# Patient Record
Sex: FEMALE | Race: WHITE | HISPANIC OR LATINO | Employment: FULL TIME | URBAN - METROPOLITAN AREA
[De-identification: names, ages, dates, MRNs, and addresses within clinical notes are randomized per-mention and may not be internally consistent; named-entity substitution may affect disease eponyms.]

---

## 2017-02-27 ENCOUNTER — LAB REQUISITION (OUTPATIENT)
Dept: LAB | Facility: HOSPITAL | Age: 27
End: 2017-02-27
Payer: COMMERCIAL

## 2017-02-27 ENCOUNTER — ALLSCRIPTS OFFICE VISIT (OUTPATIENT)
Dept: OTHER | Facility: OTHER | Age: 27
End: 2017-02-27

## 2017-02-27 ENCOUNTER — TRANSCRIBE ORDERS (OUTPATIENT)
Dept: LAB | Facility: HOSPITAL | Age: 27
End: 2017-02-27

## 2017-02-27 ENCOUNTER — APPOINTMENT (OUTPATIENT)
Dept: LAB | Facility: HOSPITAL | Age: 27
End: 2017-02-27
Payer: COMMERCIAL

## 2017-02-27 DIAGNOSIS — J34.89 OTHER SPECIFIED DISORDERS OF NOSE AND NASAL SINUSES: ICD-10-CM

## 2017-02-27 DIAGNOSIS — Z34.91 ENCOUNTER FOR SUPERVISION OF NORMAL PREGNANCY IN FIRST TRIMESTER: ICD-10-CM

## 2017-02-27 DIAGNOSIS — R10.2 PELVIC AND PERINEAL PAIN: ICD-10-CM

## 2017-02-27 DIAGNOSIS — Z33.1 PREGNANT STATE, INCIDENTAL: ICD-10-CM

## 2017-02-27 LAB
ANION GAP SERPL CALCULATED.3IONS-SCNC: 8 MMOL/L (ref 4–13)
BACTERIA UR QL AUTO: ABNORMAL /HPF
BASOPHILS # BLD AUTO: 0.01 THOUSANDS/ΜL (ref 0–0.1)
BASOPHILS NFR BLD AUTO: 0 % (ref 0–1)
BILIRUB UR QL STRIP: NEGATIVE
BUN SERPL-MCNC: 4 MG/DL (ref 5–25)
CALCIUM SERPL-MCNC: 8.8 MG/DL (ref 8.3–10.1)
CHLORIDE SERPL-SCNC: 102 MMOL/L (ref 100–108)
CLARITY UR: ABNORMAL
CO2 SERPL-SCNC: 25 MMOL/L (ref 21–32)
COLOR UR: YELLOW
CREAT SERPL-MCNC: 0.52 MG/DL (ref 0.6–1.3)
EOSINOPHIL # BLD AUTO: 0.01 THOUSAND/ΜL (ref 0–0.61)
EOSINOPHIL NFR BLD AUTO: 0 % (ref 0–6)
ERYTHROCYTE [DISTWIDTH] IN BLOOD BY AUTOMATED COUNT: 13.2 % (ref 11.6–15.1)
FLUAV AG SPEC QL IA: POSITIVE
FLUBV AG SPEC QL IA: NEGATIVE
GFR SERPL CREATININE-BSD FRML MDRD: >60 ML/MIN/1.73SQ M
GLUCOSE SERPL-MCNC: 92 MG/DL (ref 65–140)
GLUCOSE UR STRIP-MCNC: NEGATIVE MG/DL
HCT VFR BLD AUTO: 38.2 % (ref 34.8–46.1)
HGB BLD-MCNC: 12.7 G/DL (ref 11.5–15.4)
HGB UR QL STRIP.AUTO: ABNORMAL
KETONES UR STRIP-MCNC: NEGATIVE MG/DL
LEUKOCYTE ESTERASE UR QL STRIP: ABNORMAL
LYMPHOCYTES # BLD AUTO: 0.8 THOUSANDS/ΜL (ref 0.6–4.47)
LYMPHOCYTES NFR BLD AUTO: 9 % (ref 14–44)
MCH RBC QN AUTO: 29.8 PG (ref 26.8–34.3)
MCHC RBC AUTO-ENTMCNC: 33.2 G/DL (ref 31.4–37.4)
MCV RBC AUTO: 90 FL (ref 82–98)
MONOCYTES # BLD AUTO: 1 THOUSAND/ΜL (ref 0.17–1.22)
MONOCYTES NFR BLD AUTO: 11 % (ref 4–12)
NEUTROPHILS # BLD AUTO: 7.57 THOUSANDS/ΜL (ref 1.85–7.62)
NEUTS SEG NFR BLD AUTO: 80 % (ref 43–75)
NITRITE UR QL STRIP: NEGATIVE
NON-SQ EPI CELLS URNS QL MICRO: ABNORMAL /HPF
NRBC BLD AUTO-RTO: 0 /100 WBCS
OTHER STN SPEC: ABNORMAL
PH UR STRIP.AUTO: 6.5 [PH] (ref 4.5–8)
PLATELET # BLD AUTO: 195 THOUSANDS/UL (ref 149–390)
PMV BLD AUTO: 12.2 FL (ref 8.9–12.7)
POTASSIUM SERPL-SCNC: 3.4 MMOL/L (ref 3.5–5.3)
PROT UR STRIP-MCNC: NEGATIVE MG/DL
RBC # BLD AUTO: 4.26 MILLION/UL (ref 3.81–5.12)
RBC #/AREA URNS AUTO: ABNORMAL /HPF
SODIUM SERPL-SCNC: 135 MMOL/L (ref 136–145)
SP GR UR STRIP.AUTO: 1.01 (ref 1–1.03)
UROBILINOGEN UR QL STRIP.AUTO: 0.2 E.U./DL
WBC # BLD AUTO: 9.41 THOUSAND/UL (ref 4.31–10.16)
WBC #/AREA URNS AUTO: ABNORMAL /HPF

## 2017-02-27 PROCEDURE — 87400 INFLUENZA A/B EACH AG IA: CPT | Performed by: OBSTETRICS & GYNECOLOGY

## 2017-02-27 PROCEDURE — 81001 URINALYSIS AUTO W/SCOPE: CPT | Performed by: OBSTETRICS & GYNECOLOGY

## 2017-02-27 PROCEDURE — 85025 COMPLETE CBC W/AUTO DIFF WBC: CPT

## 2017-02-27 PROCEDURE — 36415 COLL VENOUS BLD VENIPUNCTURE: CPT

## 2017-02-27 PROCEDURE — 87086 URINE CULTURE/COLONY COUNT: CPT | Performed by: OBSTETRICS & GYNECOLOGY

## 2017-02-27 PROCEDURE — 80048 BASIC METABOLIC PNL TOTAL CA: CPT

## 2017-03-01 LAB
BACTERIA UR CULT: NORMAL
BACTERIA UR CULT: NORMAL

## 2017-03-02 ENCOUNTER — ALLSCRIPTS OFFICE VISIT (OUTPATIENT)
Dept: OTHER | Facility: OTHER | Age: 27
End: 2017-03-02

## 2017-03-05 ENCOUNTER — HOSPITAL ENCOUNTER (EMERGENCY)
Facility: HOSPITAL | Age: 27
Discharge: HOME | End: 2017-03-05
Attending: EMERGENCY MEDICINE | Admitting: EMERGENCY MEDICINE
Payer: COMMERCIAL

## 2017-03-05 VITALS
HEART RATE: 60 BPM | RESPIRATION RATE: 16 BRPM | SYSTOLIC BLOOD PRESSURE: 94 MMHG | BODY MASS INDEX: 21.97 KG/M2 | TEMPERATURE: 98.6 F | OXYGEN SATURATION: 100 % | DIASTOLIC BLOOD PRESSURE: 53 MMHG | WEIGHT: 124 LBS

## 2017-03-05 DIAGNOSIS — O21.9 NAUSEA/VOMITING IN PREGNANCY: Primary | ICD-10-CM

## 2017-03-05 LAB
ANION GAP BLD CALC-SCNC: 20 MMOL/L (ref 4–13)
BACTERIA UR QL AUTO: ABNORMAL /HPF
BILIRUB UR QL STRIP: NEGATIVE
BUN BLD-MCNC: 4 MG/DL (ref 5–25)
CA-I BLD-SCNC: 1.16 MMOL/L (ref 1.12–1.32)
CHLORIDE BLD-SCNC: 101 MMOL/L (ref 100–108)
CLARITY UR: CLEAR
COLOR UR: YELLOW
COLOR, POC: YELLOW
CREAT BLD-MCNC: 0.4 MG/DL (ref 0.6–1.3)
GFR SERPL CREATININE-BSD FRML MDRD: >60 ML/MIN/1.73SQ M
GLUCOSE SERPL-MCNC: 105 MG/DL (ref 65–140)
GLUCOSE UR STRIP-MCNC: NEGATIVE MG/DL
HCG UR QL: POSITIVE
HCT VFR BLD CALC: 38 % (ref 34.8–46.1)
HGB BLDA-MCNC: 12.9 G/DL (ref 11.5–15.4)
HGB UR QL STRIP.AUTO: NEGATIVE
HYALINE CASTS #/AREA URNS LPF: ABNORMAL /LPF
KETONES UR STRIP-MCNC: ABNORMAL MG/DL
LEUKOCYTE ESTERASE UR QL STRIP: ABNORMAL
NITRITE UR QL STRIP: NEGATIVE
NON-SQ EPI CELLS URNS QL MICRO: ABNORMAL /HPF
PCO2 BLD: 22 MMOL/L (ref 21–32)
PH UR STRIP.AUTO: 7 [PH] (ref 4.5–8)
POTASSIUM BLD-SCNC: 3.7 MMOL/L (ref 3.5–5.3)
PROT UR STRIP-MCNC: ABNORMAL MG/DL
RBC #/AREA URNS AUTO: ABNORMAL /HPF
SODIUM BLD-SCNC: 137 MMOL/L (ref 136–145)
SP GR UR STRIP.AUTO: 1.02 (ref 1–1.03)
SPECIMEN SOURCE: ABNORMAL
UROBILINOGEN UR QL STRIP.AUTO: 2 E.U./DL
WBC #/AREA URNS AUTO: ABNORMAL /HPF

## 2017-03-05 PROCEDURE — 87086 URINE CULTURE/COLONY COUNT: CPT

## 2017-03-05 PROCEDURE — 81002 URINALYSIS NONAUTO W/O SCOPE: CPT | Performed by: EMERGENCY MEDICINE

## 2017-03-05 PROCEDURE — 96361 HYDRATE IV INFUSION ADD-ON: CPT

## 2017-03-05 PROCEDURE — 81001 URINALYSIS AUTO W/SCOPE: CPT

## 2017-03-05 PROCEDURE — 80047 BASIC METABLC PNL IONIZED CA: CPT

## 2017-03-05 PROCEDURE — 81025 URINE PREGNANCY TEST: CPT | Performed by: EMERGENCY MEDICINE

## 2017-03-05 PROCEDURE — 85014 HEMATOCRIT: CPT

## 2017-03-05 PROCEDURE — 99284 EMERGENCY DEPT VISIT MOD MDM: CPT

## 2017-03-05 PROCEDURE — 96374 THER/PROPH/DIAG INJ IV PUSH: CPT

## 2017-03-05 RX ORDER — ONDANSETRON 2 MG/ML
4 INJECTION INTRAMUSCULAR; INTRAVENOUS ONCE
Status: COMPLETED | OUTPATIENT
Start: 2017-03-05 | End: 2017-03-05

## 2017-03-05 RX ORDER — NITROFURANTOIN 25; 75 MG/1; MG/1
100 CAPSULE ORAL 2 TIMES DAILY
Qty: 14 CAPSULE | Refills: 0 | Status: SHIPPED | OUTPATIENT
Start: 2017-03-05 | End: 2017-03-12

## 2017-03-05 RX ORDER — ONDANSETRON 4 MG/1
4 TABLET, FILM COATED ORAL EVERY 6 HOURS
Qty: 30 TABLET | Refills: 0 | Status: SHIPPED | OUTPATIENT
Start: 2017-03-05 | End: 2018-03-30 | Stop reason: ALTCHOICE

## 2017-03-05 RX ADMIN — SODIUM CHLORIDE 1000 ML: 0.9 INJECTION, SOLUTION INTRAVENOUS at 17:49

## 2017-03-05 RX ADMIN — ONDANSETRON 4 MG: 2 INJECTION INTRAMUSCULAR; INTRAVENOUS at 17:58

## 2017-03-07 LAB — BACTERIA UR CULT: NORMAL

## 2017-03-13 ENCOUNTER — TRANSCRIBE ORDERS (OUTPATIENT)
Dept: LAB | Facility: HOSPITAL | Age: 27
End: 2017-03-13

## 2017-03-13 ENCOUNTER — GENERIC CONVERSION - ENCOUNTER (OUTPATIENT)
Dept: OTHER | Facility: OTHER | Age: 27
End: 2017-03-13

## 2017-03-13 ENCOUNTER — ALLSCRIPTS OFFICE VISIT (OUTPATIENT)
Dept: OTHER | Facility: OTHER | Age: 27
End: 2017-03-13

## 2017-03-13 ENCOUNTER — APPOINTMENT (OUTPATIENT)
Dept: LAB | Facility: HOSPITAL | Age: 27
End: 2017-03-13
Attending: OBSTETRICS & GYNECOLOGY
Payer: COMMERCIAL

## 2017-03-13 DIAGNOSIS — Z34.91 ENCOUNTER FOR SUPERVISION OF NORMAL PREGNANCY IN FIRST TRIMESTER: ICD-10-CM

## 2017-03-13 DIAGNOSIS — Z34.91 ENCOUNTER FOR SUPERVISION OF NORMAL PREGNANCY IN FIRST TRIMESTER, UNSPECIFIED GRAVIDITY: ICD-10-CM

## 2017-03-13 DIAGNOSIS — Z34.91 ENCOUNTER FOR SUPERVISION OF NORMAL PREGNANCY IN FIRST TRIMESTER, UNSPECIFIED GRAVIDITY: Primary | ICD-10-CM

## 2017-03-13 LAB
ABO GROUP BLD: NORMAL
BACTERIA UR QL AUTO: ABNORMAL /HPF
BASOPHILS # BLD AUTO: 0.01 THOUSANDS/ΜL (ref 0–0.1)
BASOPHILS NFR BLD AUTO: 0 % (ref 0–1)
BILIRUB UR QL STRIP: NEGATIVE
BLD GP AB SCN SERPL QL: NEGATIVE
CLARITY UR: ABNORMAL
COLOR UR: YELLOW
EOSINOPHIL # BLD AUTO: 0.04 THOUSAND/ΜL (ref 0–0.61)
EOSINOPHIL NFR BLD AUTO: 0 % (ref 0–6)
ERYTHROCYTE [DISTWIDTH] IN BLOOD BY AUTOMATED COUNT: 13.1 % (ref 11.6–15.1)
GLUCOSE 1H P 50 G GLC PO SERPL-MCNC: 97 MG/DL
GLUCOSE UR STRIP-MCNC: NEGATIVE MG/DL
HBV SURFACE AG SER QL: NORMAL
HCT VFR BLD AUTO: 37.7 % (ref 34.8–46.1)
HGB BLD-MCNC: 12.6 G/DL (ref 11.5–15.4)
HGB UR QL STRIP.AUTO: NEGATIVE
HYALINE CASTS #/AREA URNS LPF: ABNORMAL /LPF
KETONES UR STRIP-MCNC: ABNORMAL MG/DL
LEUKOCYTE ESTERASE UR QL STRIP: ABNORMAL
LYMPHOCYTES # BLD AUTO: 2.77 THOUSANDS/ΜL (ref 0.6–4.47)
LYMPHOCYTES NFR BLD AUTO: 24 % (ref 14–44)
MCH RBC QN AUTO: 29.9 PG (ref 26.8–34.3)
MCHC RBC AUTO-ENTMCNC: 33.4 G/DL (ref 31.4–37.4)
MCV RBC AUTO: 90 FL (ref 82–98)
MONOCYTES # BLD AUTO: 0.66 THOUSAND/ΜL (ref 0.17–1.22)
MONOCYTES NFR BLD AUTO: 6 % (ref 4–12)
NEUTROPHILS # BLD AUTO: 7.88 THOUSANDS/ΜL (ref 1.85–7.62)
NEUTS SEG NFR BLD AUTO: 70 % (ref 43–75)
NITRITE UR QL STRIP: NEGATIVE
NON-SQ EPI CELLS URNS QL MICRO: ABNORMAL /HPF
NRBC BLD AUTO-RTO: 0 /100 WBCS
PH UR STRIP.AUTO: 7 [PH] (ref 4.5–8)
PLATELET # BLD AUTO: 211 THOUSANDS/UL (ref 149–390)
PMV BLD AUTO: 11.4 FL (ref 8.9–12.7)
PROT UR STRIP-MCNC: NEGATIVE MG/DL
RBC # BLD AUTO: 4.21 MILLION/UL (ref 3.81–5.12)
RBC #/AREA URNS AUTO: ABNORMAL /HPF
RH BLD: POSITIVE
RUBV IGG SERPL IA-ACNC: 130.8 IU/ML
SP GR UR STRIP.AUTO: 1.02 (ref 1–1.03)
UROBILINOGEN UR QL STRIP.AUTO: 1 E.U./DL
WBC # BLD AUTO: 11.39 THOUSAND/UL (ref 4.31–10.16)
WBC #/AREA URNS AUTO: ABNORMAL /HPF

## 2017-03-13 PROCEDURE — 81001 URINALYSIS AUTO W/SCOPE: CPT

## 2017-03-13 PROCEDURE — 82950 GLUCOSE TEST: CPT

## 2017-03-13 PROCEDURE — 36415 COLL VENOUS BLD VENIPUNCTURE: CPT

## 2017-03-13 PROCEDURE — 80081 OBSTETRIC PANEL INC HIV TSTG: CPT

## 2017-03-13 PROCEDURE — 87086 URINE CULTURE/COLONY COUNT: CPT

## 2017-03-14 LAB — BACTERIA UR CULT: NORMAL

## 2017-03-15 LAB
HIV 1+2 AB+HIV1 P24 AG SERPL QL IA: NORMAL
RPR SER QL: NORMAL

## 2017-03-27 ENCOUNTER — LAB REQUISITION (OUTPATIENT)
Dept: LAB | Facility: HOSPITAL | Age: 27
End: 2017-03-27
Payer: COMMERCIAL

## 2017-03-27 ENCOUNTER — GENERIC CONVERSION - ENCOUNTER (OUTPATIENT)
Dept: OTHER | Facility: OTHER | Age: 27
End: 2017-03-27

## 2017-03-27 DIAGNOSIS — Z11.3 ENCOUNTER FOR SCREENING FOR INFECTIONS WITH PREDOMINANTLY SEXUAL MODE OF TRANSMISSION: ICD-10-CM

## 2017-03-27 DIAGNOSIS — Z01.419 ENCOUNTER FOR GYNECOLOGICAL EXAMINATION WITHOUT ABNORMAL FINDING: ICD-10-CM

## 2017-03-27 PROCEDURE — 87491 CHLMYD TRACH DNA AMP PROBE: CPT | Performed by: OBSTETRICS & GYNECOLOGY

## 2017-03-27 PROCEDURE — 87591 N.GONORRHOEAE DNA AMP PROB: CPT | Performed by: OBSTETRICS & GYNECOLOGY

## 2017-03-27 PROCEDURE — G0145 SCR C/V CYTO,THINLAYER,RESCR: HCPCS | Performed by: OBSTETRICS & GYNECOLOGY

## 2017-03-29 LAB
CHLAMYDIA DNA CVX QL NAA+PROBE: NORMAL
N GONORRHOEA DNA GENITAL QL NAA+PROBE: NORMAL

## 2017-04-04 LAB
LAB AP GYN PRIMARY INTERPRETATION: NORMAL
Lab: NORMAL
PATH INTERP SPEC-IMP: NORMAL

## 2017-05-03 ENCOUNTER — GENERIC CONVERSION - ENCOUNTER (OUTPATIENT)
Dept: OTHER | Facility: OTHER | Age: 27
End: 2017-05-03

## 2017-06-01 ENCOUNTER — GENERIC CONVERSION - ENCOUNTER (OUTPATIENT)
Dept: OTHER | Facility: OTHER | Age: 27
End: 2017-06-01

## 2017-06-02 ENCOUNTER — ALLSCRIPTS OFFICE VISIT (OUTPATIENT)
Dept: PERINATAL CARE | Facility: CLINIC | Age: 27
End: 2017-06-02
Payer: COMMERCIAL

## 2017-06-02 PROCEDURE — 76805 OB US >/= 14 WKS SNGL FETUS: CPT | Performed by: OBSTETRICS & GYNECOLOGY

## 2017-06-02 PROCEDURE — 76817 TRANSVAGINAL US OBSTETRIC: CPT | Performed by: OBSTETRICS & GYNECOLOGY

## 2017-06-29 ENCOUNTER — GENERIC CONVERSION - ENCOUNTER (OUTPATIENT)
Dept: OTHER | Facility: OTHER | Age: 27
End: 2017-06-29

## 2017-06-29 DIAGNOSIS — Z34.92 ENCOUNTER FOR SUPERVISION OF NORMAL PREGNANCY IN SECOND TRIMESTER: ICD-10-CM

## 2017-07-21 ENCOUNTER — APPOINTMENT (OUTPATIENT)
Dept: LAB | Facility: HOSPITAL | Age: 27
End: 2017-07-21
Payer: COMMERCIAL

## 2017-07-21 ENCOUNTER — TRANSCRIBE ORDERS (OUTPATIENT)
Dept: LAB | Facility: HOSPITAL | Age: 27
End: 2017-07-21

## 2017-07-21 DIAGNOSIS — Z34.92 ENCOUNTER FOR SUPERVISION OF NORMAL PREGNANCY IN SECOND TRIMESTER: ICD-10-CM

## 2017-07-21 LAB
BASOPHILS # BLD AUTO: 0.01 THOUSANDS/ΜL (ref 0–0.1)
BASOPHILS NFR BLD AUTO: 0 % (ref 0–1)
EOSINOPHIL # BLD AUTO: 0.13 THOUSAND/ΜL (ref 0–0.61)
EOSINOPHIL NFR BLD AUTO: 1 % (ref 0–6)
ERYTHROCYTE [DISTWIDTH] IN BLOOD BY AUTOMATED COUNT: 13.7 % (ref 11.6–15.1)
GLUCOSE 1H P 50 G GLC PO SERPL-MCNC: 73 MG/DL
HCT VFR BLD AUTO: 34.7 % (ref 34.8–46.1)
HGB BLD-MCNC: 11.7 G/DL (ref 11.5–15.4)
LYMPHOCYTES # BLD AUTO: 1.81 THOUSANDS/ΜL (ref 0.6–4.47)
LYMPHOCYTES NFR BLD AUTO: 16 % (ref 14–44)
MCH RBC QN AUTO: 31.2 PG (ref 26.8–34.3)
MCHC RBC AUTO-ENTMCNC: 33.7 G/DL (ref 31.4–37.4)
MCV RBC AUTO: 93 FL (ref 82–98)
MONOCYTES # BLD AUTO: 0.7 THOUSAND/ΜL (ref 0.17–1.22)
MONOCYTES NFR BLD AUTO: 6 % (ref 4–12)
NEUTROPHILS # BLD AUTO: 8.34 THOUSANDS/ΜL (ref 1.85–7.62)
NEUTS SEG NFR BLD AUTO: 77 % (ref 43–75)
NRBC BLD AUTO-RTO: 0 /100 WBCS
PLATELET # BLD AUTO: 150 THOUSANDS/UL (ref 149–390)
PMV BLD AUTO: 10.8 FL (ref 8.9–12.7)
RBC # BLD AUTO: 3.75 MILLION/UL (ref 3.81–5.12)
WBC # BLD AUTO: 11.08 THOUSAND/UL (ref 4.31–10.16)

## 2017-07-21 PROCEDURE — 82950 GLUCOSE TEST: CPT

## 2017-07-21 PROCEDURE — 85025 COMPLETE CBC W/AUTO DIFF WBC: CPT

## 2017-07-21 PROCEDURE — 86592 SYPHILIS TEST NON-TREP QUAL: CPT

## 2017-07-21 PROCEDURE — 36415 COLL VENOUS BLD VENIPUNCTURE: CPT

## 2017-07-24 LAB — RPR SER QL: NORMAL

## 2017-07-27 ENCOUNTER — GENERIC CONVERSION - ENCOUNTER (OUTPATIENT)
Dept: OTHER | Facility: OTHER | Age: 27
End: 2017-07-27

## 2017-07-27 ENCOUNTER — LAB REQUISITION (OUTPATIENT)
Dept: LAB | Facility: HOSPITAL | Age: 27
End: 2017-07-27
Payer: COMMERCIAL

## 2017-07-27 DIAGNOSIS — N89.8 OTHER SPECIFIED NONINFLAMMATORY DISORDER OF VAGINA: ICD-10-CM

## 2017-07-27 PROCEDURE — 87070 CULTURE OTHR SPECIMN AEROBIC: CPT | Performed by: OBSTETRICS & GYNECOLOGY

## 2017-07-28 ENCOUNTER — ALLSCRIPTS OFFICE VISIT (OUTPATIENT)
Dept: PERINATAL CARE | Facility: CLINIC | Age: 27
End: 2017-07-28
Payer: COMMERCIAL

## 2017-07-28 PROCEDURE — 76816 OB US FOLLOW-UP PER FETUS: CPT | Performed by: OBSTETRICS & GYNECOLOGY

## 2017-07-29 LAB
BACTERIA GENITAL AEROBE CULT: NORMAL
BACTERIA GENITAL AEROBE CULT: NORMAL

## 2017-08-10 ENCOUNTER — GENERIC CONVERSION - ENCOUNTER (OUTPATIENT)
Dept: OTHER | Facility: OTHER | Age: 27
End: 2017-08-10

## 2017-08-24 ENCOUNTER — GENERIC CONVERSION - ENCOUNTER (OUTPATIENT)
Dept: OTHER | Facility: OTHER | Age: 27
End: 2017-08-24

## 2017-09-07 ENCOUNTER — GENERIC CONVERSION - ENCOUNTER (OUTPATIENT)
Dept: OTHER | Facility: OTHER | Age: 27
End: 2017-09-07

## 2017-09-21 ENCOUNTER — APPOINTMENT (OUTPATIENT)
Dept: LAB | Facility: HOSPITAL | Age: 27
End: 2017-09-21
Payer: COMMERCIAL

## 2017-09-21 ENCOUNTER — GENERIC CONVERSION - ENCOUNTER (OUTPATIENT)
Dept: OTHER | Facility: OTHER | Age: 27
End: 2017-09-21

## 2017-09-21 DIAGNOSIS — Z34.93 ENCOUNTER FOR SUPERVISION OF NORMAL PREGNANCY IN THIRD TRIMESTER: ICD-10-CM

## 2017-09-21 PROCEDURE — 87653 STREP B DNA AMP PROBE: CPT

## 2017-09-22 ENCOUNTER — ALLSCRIPTS OFFICE VISIT (OUTPATIENT)
Dept: PERINATAL CARE | Facility: CLINIC | Age: 27
End: 2017-09-22
Payer: COMMERCIAL

## 2017-09-22 PROCEDURE — 76816 OB US FOLLOW-UP PER FETUS: CPT | Performed by: OBSTETRICS & GYNECOLOGY

## 2017-09-23 LAB — GP B STREP DNA SPEC QL NAA+PROBE: NORMAL

## 2017-09-28 ENCOUNTER — ALLSCRIPTS OFFICE VISIT (OUTPATIENT)
Dept: OTHER | Facility: OTHER | Age: 27
End: 2017-09-28

## 2017-09-28 ENCOUNTER — GENERIC CONVERSION - ENCOUNTER (OUTPATIENT)
Dept: OTHER | Facility: OTHER | Age: 27
End: 2017-09-28

## 2017-10-05 ENCOUNTER — GENERIC CONVERSION - ENCOUNTER (OUTPATIENT)
Dept: OTHER | Facility: OTHER | Age: 27
End: 2017-10-05

## 2017-10-12 ENCOUNTER — GENERIC CONVERSION - ENCOUNTER (OUTPATIENT)
Dept: OTHER | Facility: OTHER | Age: 27
End: 2017-10-12

## 2017-10-19 ENCOUNTER — GENERIC CONVERSION - ENCOUNTER (OUTPATIENT)
Dept: OTHER | Facility: OTHER | Age: 27
End: 2017-10-19

## 2017-10-20 ENCOUNTER — HOSPITAL ENCOUNTER (OUTPATIENT)
Dept: LABOR AND DELIVERY | Facility: HOSPITAL | Age: 27
Discharge: HOME | End: 2017-10-20
Payer: COMMERCIAL

## 2017-10-20 ENCOUNTER — ANESTHESIA (INPATIENT)
Dept: LABOR AND DELIVERY | Facility: HOSPITAL | Age: 27
End: 2017-10-20
Payer: COMMERCIAL

## 2017-10-20 ENCOUNTER — HOSPITAL ENCOUNTER (INPATIENT)
Facility: HOSPITAL | Age: 27
LOS: 2 days | Discharge: HOME | End: 2017-10-22
Attending: OBSTETRICS & GYNECOLOGY | Admitting: OBSTETRICS & GYNECOLOGY
Payer: COMMERCIAL

## 2017-10-20 ENCOUNTER — ANESTHESIA EVENT (INPATIENT)
Dept: LABOR AND DELIVERY | Facility: HOSPITAL | Age: 27
End: 2017-10-20
Payer: COMMERCIAL

## 2017-10-20 DIAGNOSIS — Z3A.40 40 WEEKS GESTATION OF PREGNANCY: Primary | ICD-10-CM

## 2017-10-20 LAB
ABO GROUP BLD: NORMAL
ALBUMIN SERPL BCP-MCNC: 2.6 G/DL (ref 3.5–5)
ALP SERPL-CCNC: 153 U/L (ref 46–116)
ALT SERPL W P-5'-P-CCNC: 14 U/L (ref 12–78)
ANION GAP SERPL CALCULATED.3IONS-SCNC: 10 MMOL/L (ref 4–13)
AST SERPL W P-5'-P-CCNC: 11 U/L (ref 5–45)
BASE EXCESS BLDCOV CALC-SCNC: -2.6 MMOL/L (ref 1–9)
BASOPHILS # BLD AUTO: 0.02 THOUSANDS/ΜL (ref 0–0.1)
BASOPHILS NFR BLD AUTO: 0 % (ref 0–1)
BILIRUB SERPL-MCNC: 0.5 MG/DL (ref 0.2–1)
BLD GP AB SCN SERPL QL: NEGATIVE
BUN SERPL-MCNC: 7 MG/DL (ref 5–25)
CALCIUM SERPL-MCNC: 8.6 MG/DL (ref 8.3–10.1)
CHLORIDE SERPL-SCNC: 105 MMOL/L (ref 100–108)
CO2 SERPL-SCNC: 23 MMOL/L (ref 21–32)
CREAT SERPL-MCNC: 0.52 MG/DL (ref 0.6–1.3)
EOSINOPHIL # BLD AUTO: 0.13 THOUSAND/ΜL (ref 0–0.61)
EOSINOPHIL NFR BLD AUTO: 1 % (ref 0–6)
ERYTHROCYTE [DISTWIDTH] IN BLOOD BY AUTOMATED COUNT: 13.7 % (ref 11.6–15.1)
GFR SERPL CREATININE-BSD FRML MDRD: 130 ML/MIN/1.73SQ M
GLUCOSE SERPL-MCNC: 79 MG/DL (ref 65–140)
HCO3 BLDCOV-SCNC: 22.3 MMOL/L (ref 12.2–28.6)
HCT VFR BLD AUTO: 38.5 % (ref 34.8–46.1)
HGB BLD-MCNC: 13 G/DL (ref 11.5–15.4)
LYMPHOCYTES # BLD AUTO: 2.27 THOUSANDS/ΜL (ref 0.6–4.47)
LYMPHOCYTES NFR BLD AUTO: 15 % (ref 14–44)
MCH RBC QN AUTO: 30.9 PG (ref 26.8–34.3)
MCHC RBC AUTO-ENTMCNC: 33.8 G/DL (ref 31.4–37.4)
MCV RBC AUTO: 91 FL (ref 82–98)
MONOCYTES # BLD AUTO: 1.03 THOUSAND/ΜL (ref 0.17–1.22)
MONOCYTES NFR BLD AUTO: 7 % (ref 4–12)
NEUTROPHILS # BLD AUTO: 11.71 THOUSANDS/ΜL (ref 1.85–7.62)
NEUTS SEG NFR BLD AUTO: 77 % (ref 43–75)
NRBC BLD AUTO-RTO: 0 /100 WBCS
OXYHGB MFR BLDCOV: 39.8 %
PCO2 BLDCOV: 39.1 MM HG (ref 27–43)
PH BLDCOV: 7.37 [PH] (ref 7.19–7.49)
PLATELET # BLD AUTO: 170 THOUSANDS/UL (ref 149–390)
PMV BLD AUTO: 11.3 FL (ref 8.9–12.7)
PO2 BLDCOV: 19.2 MM HG (ref 15–45)
POTASSIUM SERPL-SCNC: 3.6 MMOL/L (ref 3.5–5.3)
PROT SERPL-MCNC: 6.3 G/DL (ref 6.4–8.2)
RBC # BLD AUTO: 4.21 MILLION/UL (ref 3.81–5.12)
RH BLD: POSITIVE
RPR SER QL: NORMAL
SAO2 % BLDCOV: 7.9 ML/DL
SODIUM SERPL-SCNC: 138 MMOL/L (ref 136–145)
SPECIMEN EXPIRATION DATE: NORMAL
WBC # BLD AUTO: 15.27 THOUSAND/UL (ref 4.31–10.16)

## 2017-10-20 PROCEDURE — 86850 RBC ANTIBODY SCREEN: CPT | Performed by: OBSTETRICS & GYNECOLOGY

## 2017-10-20 PROCEDURE — 10907ZC DRAINAGE OF AMNIOTIC FLUID, THERAPEUTIC FROM PRODUCTS OF CONCEPTION, VIA NATURAL OR ARTIFICIAL OPENING: ICD-10-PCS | Performed by: OBSTETRICS & GYNECOLOGY

## 2017-10-20 PROCEDURE — 80053 COMPREHEN METABOLIC PANEL: CPT | Performed by: OBSTETRICS & GYNECOLOGY

## 2017-10-20 PROCEDURE — 82805 BLOOD GASES W/O2 SATURATION: CPT | Performed by: OBSTETRICS & GYNECOLOGY

## 2017-10-20 PROCEDURE — 0KQM0ZZ REPAIR PERINEUM MUSCLE, OPEN APPROACH: ICD-10-PCS | Performed by: OBSTETRICS & GYNECOLOGY

## 2017-10-20 PROCEDURE — 86592 SYPHILIS TEST NON-TREP QUAL: CPT | Performed by: OBSTETRICS & GYNECOLOGY

## 2017-10-20 PROCEDURE — 3E0P3VZ INTRODUCTION OF HORMONE INTO FEMALE REPRODUCTIVE, PERCUTANEOUS APPROACH: ICD-10-PCS | Performed by: OBSTETRICS & GYNECOLOGY

## 2017-10-20 PROCEDURE — 85025 COMPLETE CBC W/AUTO DIFF WBC: CPT | Performed by: OBSTETRICS & GYNECOLOGY

## 2017-10-20 PROCEDURE — 86900 BLOOD TYPING SEROLOGIC ABO: CPT | Performed by: OBSTETRICS & GYNECOLOGY

## 2017-10-20 PROCEDURE — 86901 BLOOD TYPING SEROLOGIC RH(D): CPT | Performed by: OBSTETRICS & GYNECOLOGY

## 2017-10-20 RX ORDER — DOCUSATE SODIUM 100 MG/1
100 CAPSULE, LIQUID FILLED ORAL 2 TIMES DAILY
Status: DISCONTINUED | OUTPATIENT
Start: 2017-10-20 | End: 2017-10-22 | Stop reason: HOSPADM

## 2017-10-20 RX ORDER — DIAPER,BRIEF,INFANT-TODD,DISP
1 EACH MISCELLANEOUS AS NEEDED
Status: DISCONTINUED | OUTPATIENT
Start: 2017-10-20 | End: 2017-10-22 | Stop reason: HOSPADM

## 2017-10-20 RX ORDER — DIPHENHYDRAMINE HYDROCHLORIDE 50 MG/ML
25 INJECTION INTRAMUSCULAR; INTRAVENOUS EVERY 6 HOURS PRN
Status: DISCONTINUED | OUTPATIENT
Start: 2017-10-20 | End: 2017-10-20

## 2017-10-20 RX ORDER — OXYTOCIN/RINGER'S LACTATE 30/500 ML
250 PLASTIC BAG, INJECTION (ML) INTRAVENOUS CONTINUOUS
Status: DISCONTINUED | OUTPATIENT
Start: 2017-10-20 | End: 2017-10-22 | Stop reason: HOSPADM

## 2017-10-20 RX ORDER — ONDANSETRON 2 MG/ML
4 INJECTION INTRAMUSCULAR; INTRAVENOUS EVERY 8 HOURS PRN
Status: DISCONTINUED | OUTPATIENT
Start: 2017-10-20 | End: 2017-10-22 | Stop reason: HOSPADM

## 2017-10-20 RX ORDER — IBUPROFEN 600 MG/1
600 TABLET ORAL EVERY 6 HOURS PRN
Status: DISCONTINUED | OUTPATIENT
Start: 2017-10-20 | End: 2017-10-22 | Stop reason: HOSPADM

## 2017-10-20 RX ORDER — BUPIVACAINE HYDROCHLORIDE 2.5 MG/ML
INJECTION, SOLUTION INFILTRATION; PERINEURAL AS NEEDED
Status: DISCONTINUED | OUTPATIENT
Start: 2017-10-20 | End: 2017-10-20 | Stop reason: SURG

## 2017-10-20 RX ORDER — OXYCODONE HYDROCHLORIDE AND ACETAMINOPHEN 5; 325 MG/1; MG/1
1 TABLET ORAL EVERY 4 HOURS PRN
Status: DISCONTINUED | OUTPATIENT
Start: 2017-10-20 | End: 2017-10-22 | Stop reason: HOSPADM

## 2017-10-20 RX ORDER — FENTANYL CITRATE 50 UG/ML
INJECTION, SOLUTION INTRAMUSCULAR; INTRAVENOUS AS NEEDED
Status: DISCONTINUED | OUTPATIENT
Start: 2017-10-20 | End: 2017-10-20 | Stop reason: SURG

## 2017-10-20 RX ORDER — CALCIUM CARBONATE 200(500)MG
1000 TABLET,CHEWABLE ORAL DAILY PRN
Status: DISCONTINUED | OUTPATIENT
Start: 2017-10-20 | End: 2017-10-22 | Stop reason: HOSPADM

## 2017-10-20 RX ORDER — DIPHENHYDRAMINE HCL 25 MG
25 TABLET ORAL EVERY 6 HOURS PRN
Status: DISCONTINUED | OUTPATIENT
Start: 2017-10-20 | End: 2017-10-22 | Stop reason: HOSPADM

## 2017-10-20 RX ORDER — ACETAMINOPHEN 325 MG/1
650 TABLET ORAL EVERY 6 HOURS PRN
Status: DISCONTINUED | OUTPATIENT
Start: 2017-10-20 | End: 2017-10-22 | Stop reason: HOSPADM

## 2017-10-20 RX ORDER — OXYTOCIN/RINGER'S LACTATE 30/500 ML
1-30 PLASTIC BAG, INJECTION (ML) INTRAVENOUS
Status: DISCONTINUED | OUTPATIENT
Start: 2017-10-20 | End: 2017-10-20

## 2017-10-20 RX ORDER — LIDOCAINE HYDROCHLORIDE AND EPINEPHRINE 15; 5 MG/ML; UG/ML
INJECTION, SOLUTION EPIDURAL AS NEEDED
Status: DISCONTINUED | OUTPATIENT
Start: 2017-10-20 | End: 2017-10-20 | Stop reason: SURG

## 2017-10-20 RX ORDER — NALBUPHINE HCL 10 MG/ML
5 AMPUL (ML) INJECTION
Status: DISCONTINUED | OUTPATIENT
Start: 2017-10-20 | End: 2017-10-20

## 2017-10-20 RX ORDER — OXYCODONE HYDROCHLORIDE AND ACETAMINOPHEN 5; 325 MG/1; MG/1
2 TABLET ORAL EVERY 4 HOURS PRN
Status: DISCONTINUED | OUTPATIENT
Start: 2017-10-20 | End: 2017-10-22 | Stop reason: HOSPADM

## 2017-10-20 RX ORDER — SODIUM CHLORIDE, SODIUM LACTATE, POTASSIUM CHLORIDE, CALCIUM CHLORIDE 600; 310; 30; 20 MG/100ML; MG/100ML; MG/100ML; MG/100ML
125 INJECTION, SOLUTION INTRAVENOUS CONTINUOUS
Status: DISCONTINUED | OUTPATIENT
Start: 2017-10-20 | End: 2017-10-20

## 2017-10-20 RX ADMIN — SODIUM CHLORIDE, SODIUM LACTATE, POTASSIUM CHLORIDE, AND CALCIUM CHLORIDE 125 ML/HR: .6; .31; .03; .02 INJECTION, SOLUTION INTRAVENOUS at 09:20

## 2017-10-20 RX ADMIN — DOCUSATE SODIUM 100 MG: 100 CAPSULE, LIQUID FILLED ORAL at 21:09

## 2017-10-20 RX ADMIN — LIDOCAINE HYDROCHLORIDE AND EPINEPHRINE 2 ML: 15; 5 INJECTION, SOLUTION EPIDURAL at 12:07

## 2017-10-20 RX ADMIN — Medication 2 MILLI-UNITS/MIN: at 10:05

## 2017-10-20 RX ADMIN — OXYCODONE HYDROCHLORIDE AND ACETAMINOPHEN 1 TABLET: 5; 325 TABLET ORAL at 21:08

## 2017-10-20 RX ADMIN — Medication: at 12:10

## 2017-10-20 RX ADMIN — BENZOCAINE AND MENTHOL: 20; .5 SPRAY TOPICAL at 18:55

## 2017-10-20 RX ADMIN — IBUPROFEN 600 MG: 600 TABLET ORAL at 19:16

## 2017-10-20 RX ADMIN — SODIUM CHLORIDE, SODIUM LACTATE, POTASSIUM CHLORIDE, AND CALCIUM CHLORIDE 125 ML/HR: .6; .31; .03; .02 INJECTION, SOLUTION INTRAVENOUS at 12:00

## 2017-10-20 RX ADMIN — FENTANYL CITRATE 15 MCG: 50 INJECTION, SOLUTION INTRAMUSCULAR; INTRAVENOUS at 11:59

## 2017-10-20 RX ADMIN — WITCH HAZEL 1 PAD: 500 SOLUTION RECTAL; TOPICAL at 18:54

## 2017-10-20 RX ADMIN — SODIUM CHLORIDE, SODIUM LACTATE, POTASSIUM CHLORIDE, AND CALCIUM CHLORIDE 125 ML/HR: .6; .31; .03; .02 INJECTION, SOLUTION INTRAVENOUS at 13:31

## 2017-10-20 RX ADMIN — BUPIVACAINE HYDROCHLORIDE 1 ML: 2.5 INJECTION, SOLUTION INFILTRATION; PERINEURAL at 11:59

## 2017-10-20 RX ADMIN — HYDROCORTISONE 1 APPLICATION: 1 CREAM TOPICAL at 18:55

## 2017-10-20 RX ADMIN — Medication 250 MILLI-UNITS/MIN: at 18:19

## 2017-10-20 NOTE — OB LABOR/OXYTOCIN SAFETY PROGRESS
Oxytocin Safety Progress Check Note - Geovanny Vail 28 y o  female MRN: [id number]    Unit/Bed#: -01 Encounter: [id number]    Obstetric History       T1      L1     SAB0   TAB0   Ectopic0   Multiple0   Live Births1      Gestational Age: 41w4d  Dose (antwon-units/min) Oxytocin: 4 antwon-units/min  Contraction Frequency (minutes): 1 5-4 5  Contraction Quality: Mild  Tachysystole: No   Dilation: 4        Effacement (%): 60  Station: -2  Baseline Rate: 140 bpm  Fetal Heart Rate: 141 BPM  FHR Category: Category I          Notes/comments:      AROM'd for clear, mod  Fluid at 1115  Pt comfortable  May receive Nubain and epidural PRN for pain      D/w Dr Gonzalez Gan DO 10/20/2017 11:20 AM

## 2017-10-20 NOTE — H&P
H&P Exam - Obstetrics   Dorinda Valentino 28 y o  female MRN: [id number]  Unit/Bed#: -01 Encounter: [id number]    Assessment/Plan     Assessment:  28year old  at 40 weeks and 2 days here for elective induction of labor  GBS negative  Plan:  1  Admit for elective induction of labor  -routine labs  -IV access  -clear liquid diet  -Induction with pitocin titration per protocol    History of Present Illness   Chief Complaint: elective induction of labor    HPI:  Dorinda Valentino is a 28 y o   female with an BALDEMAR of 10/18/17 at 40 weeks and 2 days gestation who is being admitted for elective induction of labor  Her current obstetrical history is uncomplicated  Contractions: None  Leakage of fluid: None  Bleeding: None  Fetal movement: present  Pregnancy complications: none  Review of Systems   Constitutional: Negative for chills and fever  Eyes: Negative for visual disturbance  Respiratory: Negative for chest tightness and shortness of breath  Cardiovascular: Negative for chest pain  Gastrointestinal: Negative for abdominal pain  Neurological: Negative for dizziness and headaches  Historical Information   OB History    Para Term  AB Living   2 1 1     1   SAB TAB Ectopic Multiple Live Births           1      # Outcome Date GA Lbr Garrick/2nd Weight Sex Delivery Anes PTL Lv   2 Current            1 Term     F Vag-Spont   RACHELLE        Baby complications/comments: none  No past medical history on file    Past Surgical History:   Procedure Laterality Date    CHOLECYSTECTOMY LAPAROSCOPIC N/A 2016    Procedure: CHOLECYSTECTOMY LAPAROSCOPIC;  Surgeon: Marla Velazquez MD;  Location:  MAIN OR;  Service:      Social History   History   Alcohol Use No     History   Drug Use No     History   Smoking Status    Never Smoker   Smokeless Tobacco    Never Used     Family History: non-contributory    Meds/Allergies   all medications and allergies reviewed  No Known Allergies    Objective   Vitals:   There is no height or weight on file to calculate BMI  Invasive Devices          No matching active lines, drains, or airways          Physical Exam   Constitutional: She is oriented to person, place, and time  She appears well-developed and well-nourished  No distress  HENT:   Head: Normocephalic and atraumatic  Neck: Normal range of motion  Cardiovascular: Normal rate  Pulmonary/Chest: Effort normal    Abdominal: Soft  There is no tenderness  Genitourinary: Vagina normal and uterus normal    Musculoskeletal: Normal range of motion  Neurological: She is alert and oriented to person, place, and time  Skin: Skin is warm and dry  She is not diaphoretic  Psychiatric: She has a normal mood and affect   Her behavior is normal      SVE: 4/60/-1    Prenatal Labs:   Blood Type:   Lab Results   Component Value Date/Time    ABO Grouping O 03/13/2017 11:13 AM    ABO Grouping O 07/17/2015 03:15 AM     , D (Rh type):   Lab Results   Component Value Date/Time    Rh Factor Positive 03/13/2017 11:13 AM    Rh Factor Positive 07/17/2015 03:15 AM     , Antibody Screen:   Lab Results   Component Value Date/Time    Antibody Screen Negative 03/13/2017 11:13 AM    Antibody Screen Negative 07/17/2015 03:15 AM    , HCT/HGB:   Lab Results   Component Value Date/Time    Hematocrit 34 7 (L) 07/21/2017 12:38 PM    Hematocrit 32 9 (L) 07/24/2015 09:02 AM    Hemoglobin 11 7 07/21/2017 12:38 PM    Hemoglobin 10 6 (L) 07/24/2015 09:02 AM      , Platelets:   Lab Results   Component Value Date/Time    Platelets 267 74/07/9738 12:38 PM    Platelets 831 88/18/2747 09:02 AM      , 1 hour Glucola:   Lab Results   Component Value Date/Time    GLUCOSE 1 HR 50 GM GLUC CHALLENGE-PREG  04/17/2015 03:25 PM    Glucose 73 07/21/2017 12:38 PM   , Rubella:   Lab Results   Component Value Date/Time    RUBELLA IGG QUANTITATION >175 0 12/03/2014 12:20 PM    Rubella IgG Quant 130 8 03/13/2017 11:13 AM , VDRL/RPR:   Lab Results   Component Value Date/Time    RPR SCREEN Nonreactive 07/17/2015 03:15 AM    RPR Non-Reactive 07/21/2017 12:38 PM      , Hep B:   Lab Results   Component Value Date/Time    HEPATITIS B SURFACE ANTIGEN Non-Reactive (q 12/03/2014 12:20 PM    Hepatitis B Surface Ag Non-reactive 03/13/2017 11:13 AM     , HIV:   Lab Results   Component Value Date/Time    HIV-1/2 AB-AG Non-Reactive (q 12/03/2014 12:20 PM    HIV-1/HIV-2 Ab Non-Reactive 03/13/2017 11:13 AM     , Chlamydia: negative   , Gonorrhea:   Lab Results   Component Value Date/Time    N GONORRHOEAE, AMPLIFIED DNA Negative 12/17/2014 09:01 AM    N gonorrhoeae, DNA Probe N  gonorrhoeae Amplified DNA Negative 03/27/2017 05:54 PM     , Group B Strep:    Lab Results   Component Value Date/Time    Strep Grp B PCR Negative for Beta Hemolytic Strep Grp B by PCR 09/21/2017 04:11 PM          Imaging, EKG, Pathology, and Other Studies: I have personally reviewed pertinent reports

## 2017-10-20 NOTE — DISCHARGE INSTRUCTIONS
Parto vaginal   LO QUE USTED DEBE SABER:   El parto vaginal se produce cuando el bebé nace a través de la vagina (canal del parto)  DESPUÉS DE SER DADO DE ANNA:   Medicamentos:   · Los antiiflamatorios no esteroideos (AINEs) ayudan a reducir inflamación y dolor o fiebre  Giuliana medicamento esta disponible con o sin nando receta médica  Los AINEs podrían causar sangrado estomacal o problemas en los riñones en [employer]  Si usted esta tomando un anticoágulante, siempre  pregunte a scott proveedor de steven si los AINEs son seguros para usted  Antes de [employer], rohit siempre la etiqueta de información y siga rajni indicaciones  · Gideon rajni medicamentos cristian se le haya indicado  Llame a scott proveedor de steven si usted piensa que scott medicamento no le está ayudando o si tiene efectos secundarios  Infórmele si es alérgico a cualquier medicamento  Mantenga nando lista vigente de los medicamentos, vitaminas, y hierbas que dorothy  [address] cantidades, la frecuencia con que los dorothy y por qué los dorothy  Traiga la lista o los envases de rajni medicamentos a las citas de seguimiento  Mantenga la lista consigo en earl de nando emergencia  Bote las listas [place]  Programe nando jassi con scott médico de cabecera:  Maranda Huber de las mujeres necesitan regresar donde scott médico 6 semanas después de un parto vaginal  Pregunte sobre cómo cuidar de rajni heridas o suturas  Anote rajni preguntas para que recuerde hacerlas mariela rajni citas  Actividad:  Descanse siempre que le sea posible  Trate de minimizar rajni actividades  Usted podría empezar a hacer algo de ejercicio poco tiempo después de tener a scott bebé  Hable con scott médico de cabecera antes de empezar a ejercitarse  Si usted trabaja fuera del hogar, pregunte también cuándo debe regresar a scott trabajo  Ejercicios de Kegel:  Los ejercicios de Kegel podrían ayudar a que rajni músculos vaginales y rectales sanen más rápido   Para hacer éstos ejercicios, flexione y relaje los músculos alrededor de scott vagina  Los ejercicios de Kegel fortalecen los músculos  Cuidado de los senos:  Cuando le baja la [place], rajni senos pueden sentirse llenos y duros  Pídale más información a scott médico acerca de cómo cuidar rajni senos, aunque no esté amamantando a scott bebé  Estreñimiento: No radha esfuerzo para realizar nando evacuación intestinal si esta teniendo dificultad para hacerlo  Alimentos altos en [place], [facility] líquido, y el ejercicio regular puede ayudar a prevenir estreñimiento  Ejemplos de estos alimentos incluyen, frutas y afrecho  El [facility] and [facility] y agua son Alejandro evanss líquidos para art  El ejercicio regular ayuda con la función de scott sistema digestivo  Es posible que usted también tenga que usar fibra que se puede obtener sin [place] y laxantes  [employer] se le haya indicado  Hemorroides:  El embarazo puede causar hemorroides graves  Es posible que usted tenga dolor en el recto a causa de las hemorroides  Pregúntele acerca del cuidado de las hemorroides  Cuidado del perineo:  El perineo es el área entre la vagina y el ano  Mic Markell y seca el área para ayudarla a sanar y prevenir nando infección  Lave el área cuidadosamente con agua y [address] se Antonia Danger  Enjuague el perineo con agua tibia cuando [place] el servicio sanitario  Scott médico podría sugerirle usar un baño de asiento de agua tibia para disminuir el dolor  Un baño de asiento es cuando se llena nando halie de baño o basenilla hasta el nivel de las caderas  Se recomienda sentarse en el baño de asiento de 20 a 30 minutos, o cristian [employer]  Flujo vaginal:  Usted tendrá flujo vaginal, llamado loquios, después del parto  Los loquios son de color mata brillante el primer o donnie día  Al cuarto día, la cantidad [place], y se torna de un color mata - marrón  Use nando toalla sanitaria en vez de un tampón para prevenir infecciones vaginales  Es normal tener loquios hasta 8 semanas después de que nazca scott bebé  Períodos menstruales: Scott período puede empezar de [employer] 7 a 12 semanas después de que nazca scott bebé  Si está amamantando, puede art [employer] para que scott período regrese  Usted puede quedar embarazada de nuevo aún si no tiene scott periodo menstrual  Hable con scott médico de cabecera acerca de un método anticonceptivo que sea evans para usted, si no desea quedar embarazada  Cambios en el estado de ánimo:  Muchas madres tienen algún tipo de cambio en scott estado de ánimo después de graciela a galen a scott bebé  Algunos de [employer] se producen debido a la falta de sueño, cambios hormonales, y el cuidado de un bebé recién nacido  Algunos cambios de ánimo pueden ser [address], cristian la depresión posparto  Hable con scott médico de cabecera si se siente incapaz de cuidarse a sí misma o a scott bebé  Relaciones sexuales:  Probablemente tendrá que evitar tener relaciones sexuales por 6 a 7 semanas después de gogo dado a galen  Usted podría notar menos deseo sexual, o incluso podría sentir dolor al [employer]  Se recomienda el uso de un lubricante vaginal (gel) para ayudarle a sentirse más cómoda [employer]  Comuníquese con scott médico de cabecera si:   · Usted presenta sangrado vaginal que empapa 1 toalla higiénica o más en 1 hora  · Tiene fiebre  · Scott dolor no se le [place] o [place]  · La piel entre scott vagina y recto está inflamada, caliente o Maranda Eliazar  · Emma senos están inflamados, duros o dolorosos  · Se siente muy bhupendra o deprimida  · Se siente más cansada de lo usual      · Tiene preguntas o inquietudes acerca de scott condición o cuidados  Busque ayuda inmediata o llame al 911 si:   · Tiene pus o flujo amarillo saliendo de la vagina o herida  · Está orinando muy poquito o no orina del todo  · Emma brazos o piernas se sienten calientes, sensibles y dolorosos  Podrían verse inflamados y rojos       · Siente desvanecimiento, tiene dolor de pecho repentino o peor o dificultad para respirar  Usted podría sentir más dolor cuando respira profundo o tose o podría toser roddy  © 2014 [address] is for End User's use only and may not be sold, redistributed or otherwise used for commercial purposes  All illustrations and images included in CareNotes® are the copyrighted property of A D A M , Inc  or Rhett Baltazar  Esta información es sólo para uso en educación  Scott intención no es darle un consejo médico sobre enfermedades o tratamientos  Colsulte con scott Zelpha Roch farmacéutico antes de seguir cualquier régimen médico para saber si es seguro y efectivo para usted

## 2017-10-20 NOTE — L&D DELIVERY NOTE
DELIVERY NOTE  Dorinda Valentino 28 y o  female MRN: [id number]  Unit/Bed#:  303-01 Encounter: [id number]    Obstetrician:   Dr La Anaya    Assistant: Dr Mary Kate Rodas, Dr Latonya Mckeon    Pre-Delivery Diagnosis: Term pregnancy  Induced labor  Single fetus    Post-Delivery Diagnosis: Same as above - Delivered  2nd degree laceration    Procedure: Spontaneous vaginal delivery  Repair 2nd degree spontaneous laceration      Indications for instrumental delivery: None    Estimated Blood Loss:  300mL    Cord Blood Gases:   Arterial: inadequate sample  Venous pH: 7 374, BE: -2 6    Complications:  None    Description of Delivery:  Pt is a  at [address] [place] [address] admitted for elective IOL on 10/20/17  No complications in pregnancy  She was started on pitocin titration and given an epidural for anesthesia  She was progressed well and was found to be complete @1808 and pushing @ 1813  Patient delivered a viable Female  over 2nd degree laceration  A nuchal cord was not noted  With the assistance of maternal expulsive efforts and downward traction of fetal head, the anterior shoulder was delivered without difficulty, followed by the remainder of the infant's body  The  was placed on the mother's chest and provided routine care by the  staff  There was delayed clamping of the umbilical cord, after which, was doubly clamped and cut  Umbilical cord blood and umbilical artery and venous gases were collected  Placenta was delivered with fundal massage and gentle traction on the cord with active management of the third stage of labor  Placenta delivered intact with a 3-vessel cord  Active management of the third stage of labor was undertaken with IV pitocin  Bleeding was noted to be under control  Inspection of the perineum, vagina, labia, and urethra revealed a 2nd degree which was then repaired in standard fashion with 3-0 Vicryl rapid  The lacerations showed good tissue reapproximation and hemostasis      Mother and baby are currently recovering nicely in stable condition  Dr Charlie Kessler was present and participated in the entire delivery        Meet Stoddard MD  OBN, PGY-1  10/20/2017 7:36 PM

## 2017-10-20 NOTE — OB LABOR/OXYTOCIN SAFETY PROGRESS
Oxytocin Safety Progress Check Note - Dorinda Meals 28 y o  female MRN: [id number]    Unit/Bed#: -01 Encounter: [id number]    Obstetric History       T1      L1     SAB0   TAB0   Ectopic0   Multiple0   Live Births1      Gestational Age: 41w4d  Dose (antwon-units/min) Oxytocin: 2 antwon-units/min  Contraction Frequency (minutes): 2-3  Contraction Quality: Moderate  Tachysystole: No   Dilation: 6        Effacement (%): 70  Station: -1  Baseline Rate: 140 bpm  Fetal Heart Rate: 158 BPM  FHR Category: Category II          Notes/comments:      Pt had recurrent early decelerations with intermittent variables and 1x late deceleration  Repositioned patient and decreased pitocin by 2milliunits from 4 to 2  Will observe strip and resume titration in half an hour  Strip has returned to Category 1  Moderate variability, reactive accelerations and no decelerations      D/w Dr Gwen Savage,  10/20/2017 1:00 PM

## 2017-10-20 NOTE — ANESTHESIA POSTPROCEDURE EVALUATION
Post-Op Assessment Note      CV Status:  Stable    Mental Status:  Alert and awake    Hydration Status:  Euvolemic    PONV Controlled:  Controlled    Airway Patency:  Patent    Post Op Vitals Reviewed: Yes          Staff: Anesthesiologist     Post-op block assessment: site cleaned, catheter intact and no complications        BP      Temp      Pulse     Resp      SpO2

## 2017-10-20 NOTE — PLAN OF CARE
BIRTH - VAGINAL/ SECTION     Fetal and maternal status remain reassuring during the birth process [de-identified]     Emotionally satisfying birthing experience for mother/fetus [address] Discharge to home or other facility with appropriate resources Progressing        INFECTION - ADULT     Absence or prevention of progression during hospitalization Progressing     Absence of fever/infection during neutropenic period Progressing        Knowledge Deficit     Verbalizes understanding of labor plan Progressing     Patient/family/caregiver demonstrates understanding of disease process, treatment plan, medications, and discharge instructions Progressing        Labor & Delivery     Manages discomfort Progressing     Patient vital signs are stable Progressing        PAIN - ADULT     Verbalizes/displays adequate comfort level or baseline comfort level Progressing        SAFETY ADULT     Patient will remain free of falls Progressing     Maintain or return to baseline ADL function Progressing     Maintain or return mobility status to optimal level Progressing

## 2017-10-20 NOTE — PROGRESS NOTES
Cat 2 Huddle:    Participants: Amanda Carpenter MD (Attending), Ninoska Connelly MD (Chief Resident), Sissy Feng (Bedside Nurse), Andre Canseco (Charge Nurse)    Reason: Discontinued pitocin for repetitive decelerations    History: Patient is being electively induced and received an epidural around 1200 at which time pitocin was 4 munits/min with Cat 1 strip  About 1240 patient had a run of 3 contractions in a row with late decelerations  Pitocin was turned to 2 munits/min, position changes were undertaken, and fluid bolus was given  Decels resolved and strip returned to Cat 1  Then at about 1315, the strip developed lates again for 3 consecutive contractions so the pitocin was stopped and lates decreased frequency  FHT: 135 Cat 2 secondary to occasional late decels but strip with moderate variability and accelerations so no evidence of acidemia at this time    CVX: Dilation: 6  Effacement (%): 70  Station: -1  Cervical Characteristics: Anterior, Soft    Edmond[place]: Contraction Frequency (minutes): 2-5  Contraction Duration (seconds): 60-90  Contraction Quality: Moderate    PERRT initiated: No    Plan: Keep pitocin off for now and reevaluate in about 30-45 minutes to evaluate contraction pattern and fetal heart rate  All members were in agreement with the plan of care      Tae Carlisle MD  10/20/2017  1:36 PM

## 2017-10-20 NOTE — OB LABOR/OXYTOCIN SAFETY PROGRESS
Oxytocin Safety Progress Check Note - Cassidy Ritter 28 y o  female MRN: [id number]    Unit/Bed#: -01 Encounter: [id number]    Obstetric History       T1      L1     SAB0   TAB0   Ectopic0   Multiple0   Live Births1      Gestational Age: 41w4d  Dose (antwon-units/min) Oxytocin: 0 antwon-units/min  Contraction Frequency (minutes): 2-4 5  Contraction Quality: Moderate  Tachysystole: No   Dilation: 7-8        Effacement (%): 80  Station: -1  Baseline Rate: 140 bpm  Fetal Heart Rate: 136 BPM  FHR Category: Category II late decelerations but moderate variability and accelerations    Notes/comments:      Pt making adequate change  Will continue to reassess  D/w Dr Karyna Matute    Patient continues to have occasional late decelerations that are relieved by position changes  Pitocin still off and patient's contraction pattern is Q2-4 minutes and patient is making adequate change  Therefore, we will keep pitocin off for now and reevaluate in about 2 hours    Tania Reich DO 10/20/2017 2:22 PM

## 2017-10-20 NOTE — ANESTHESIA PROCEDURE NOTES
CSE Block    Patient location during procedure: OB  Start time: 10/20/2017 11:54 AM  Reason for block: procedure for pain and at surgeon's request  Staffing  Anesthesiologist: Giancarlo Llanos  Performed: anesthesiologist   Preanesthetic Checklist  Completed: patient identified, site marked, surgical consent, pre-op evaluation, timeout performed, IV checked, risks and benefits discussed and monitors and equipment checked  CSE  Patient position: sitting  Prep: ChloraPrep  Patient monitoring: cardiac monitor and continuous pulse ox  Approach: midline  Spinal Needle  Needle type: pencil-tip   Needle gauge: 27 G  Needle length: 10 cm  Epidural Needle  Injection technique: ALYCIA saline  Needle type: Tuohy   Needle gauge: 18 G  Location: lumbar (1-5)  Catheter  Catheter type: side hole  Catheter size: 20 G  Catheter at skin depth: 13 cm  Test dose: negative  Assessment  Sensory level: [id number] Assessment:  negative aspiration for heme, no paresthesia on injection, positive aspiration for clear CSF and no pain on injection  Additional Notes  CSE with 1mL 0 25% bupi and 15 mcg fentanyl  LORTS at 7 cm  Catheter easily threaded, secured at 13 cm with lock it  Good analgesia  Negative SA test dose

## 2017-10-20 NOTE — DISCHARGE SUMMARY
Discharge Summary - Orlando Delacruz 28 y o  female MRN: [id number]    Unit/Bed#: -01 Encounter: [id number]    Admission Date: 10/20/2017     Discharge Date: 10/22/2017    Admitting Diagnosis:   1  Pregnancy at 40w2d  2  Elective IOL    Discharge Diagnosis: same, delivered    Procedures: spontaneous vaginal delivery, repair of 2nd degree laceration    Attending: Lisa Jiménez MD     Attending on day of discharge: Dr Kiki William Course:     Orlando Delacruz is a 28 y o   at 40w2d wks who was initially admitted for elective IOL on 10/20/17  No complications in pregnancy  She was started on pitocin titration and given an epidural for anesthesia  She was progressed well and was found to be complete @1808 and pushing @ 1813  She delivered a viable female  on 10/20/17 at 1818  Weight 6lbs 2oz via spontaneous vaginal delivery  Apgars were 8 (1 min) and 9 (5 min)  Patient tolerated the procedure well and was transferred to recovery in stable condition  Her postpartum course was uncomplicated  Her postpartum pain was well controlled with oral analgesics  On day of discharge, she was ambulating and able to reasonably perform all ADLs  She was voiding and had appropriate bowel function  Pain was well controlled  She was discharged home on postpartum day #2 without complications  Patient was instructed to follow up with her OB as an outpatient and was given appropriate warnings to call provider if she develops signs of infection or uncontrolled pain  Complications: none apparent    Condition at discharge: good     Discharge instructions/Information to patient and family:   See after visit summary for information provided to patient and family  Provisions for Follow-Up Care:  See after visit summary for information related to follow-up care and any pertinent home health orders        Disposition: Home    Planned Readmission: Gisele Thakur DO  OB/GYN, PGY2  10/22/2017, 10:42 AM

## 2017-10-20 NOTE — ANESTHESIA PREPROCEDURE EVALUATION
Review of Systems/Medical History  Patient summary reviewed        Cardiovascular  Negative cardio ROS    Pulmonary  Negative pulmonary ROS ,        GI/Hepatic  Negative GI/hepatic ROS   GERD well controlled,        Negative  ROS        Endo/Other  Negative endo/other ROS      GYN  Currently pregnant ,          Hematology  Negative hematology ROS Anemia ,     Musculoskeletal  Negative musculoskeletal ROS        Neurology  Negative neurology ROS      Psychology   Negative psychology ROS            Physical Exam    Airway    Mallampati score: II  TM Distance: >3 FB  Neck ROM: full     Dental   No notable dental hx     Cardiovascular  Comment: Negative ROS, Rhythm: regular, Rate: normal,     Pulmonary  Breath sounds clear to auscultation,     Other Findings        Anesthesia Plan  ASA Score- 2       Anesthesia Type- epidural with ASA Monitors  Additional Monitors:   Airway Plan:     Comment: Patient requesting labor analgesia  Patient seen and examined  History reviewed  Lab Results       Component                Value               Date                       PLT                      170                 10/20/2017                 PLT                      150                 2017                 PLT                      211                 2017             Risks/benefits of epidural anesthesia discussed  Risk of post dural puncture headache,hypotension, partial or failed block including inadequate analgesia, and rare emergencies including epidural abscess and epidural hematoma, reviewed  Anesthetic plan for possible  reviewed with patient including use of in-situ epidural, spinal, and general anesthesia  All questions answered and concerns addressed  Eleazar Smith DO      Induction- intravenous  Informed Consent- Anesthetic plan and risks discussed with patient

## 2017-10-20 NOTE — PLAN OF CARE
BIRTH - VAGINAL/ SECTION     Fetal and maternal status remain reassuring during the birth process Completed     Emotionally satisfying birthing experience for mother/fetus Completed        Labor & Delivery     Manages discomfort Completed     Patient vital signs are stable Completed

## 2017-10-20 NOTE — OB LABOR/OXYTOCIN SAFETY PROGRESS
Oxytocin Safety Progress Check Note - Salley Baumgarten 28 y o  female MRN: [id number]    Unit/Bed#: -01 Encounter: [id number]    Obstetric History       T1      L1     SAB0   TAB0   Ectopic0   Multiple0   Live Births1      Gestational Age: 41w4d  Dose (antwon-units/min) Oxytocin: 0 antwon-units/min  Contraction Frequency (minutes): 2 5-3 5  Contraction Quality: Moderate  Tachysystole: No   Dilation: 8        Effacement (%): 100  Station: -1  Baseline Rate: 135 bpm  Fetal Heart Rate: 138 BPM  FHR Category: Category II          Notes/comments:      Pt making gradual change  Continue current management      D/w Dr Kc Her, DO 10/20/2017 3:22 PM

## 2017-10-21 RX ADMIN — IBUPROFEN 600 MG: 600 TABLET ORAL at 09:34

## 2017-10-21 RX ADMIN — DOCUSATE SODIUM 100 MG: 100 CAPSULE, LIQUID FILLED ORAL at 19:22

## 2017-10-21 RX ADMIN — DOCUSATE SODIUM 100 MG: 100 CAPSULE, LIQUID FILLED ORAL at 09:34

## 2017-10-21 RX ADMIN — OXYCODONE HYDROCHLORIDE AND ACETAMINOPHEN 1 TABLET: 5; 325 TABLET ORAL at 01:17

## 2017-10-21 RX ADMIN — OXYCODONE HYDROCHLORIDE AND ACETAMINOPHEN 1 TABLET: 5; 325 TABLET ORAL at 12:15

## 2017-10-21 NOTE — PROGRESS NOTES
Progress Note - OB/GYN   Bernarda Benitez 28 y o  female MRN: [id number]  Unit/Bed#: -42 Encounter: [id number]    Assessment:  PP#1 s/p Spontaneous Vaginal Delivery, stable    Plan:  1  Routine postpartum care    Subjective/Objective   Chief Complaint:     PP#1 s/p Spontaneous Vaginal Delivery    Subjective:     Pain: cramping  Tolerating PO: yes  Voiding: yes  Flatus: yes  BM: no  Ambulating: yes  Breastfeeding: Breastfeeding  Chest pain: no  Shortness of breath: no  Leg pain: no  Lochia: minimal    Objective:     Vitals:  Vitals:    10/20/17 2106 10/20/17 2316 10/21/17 0514 10/21/17 0813   BP: 102/62 97/58 102/61 91/58   Pulse: (!) 119 86 90 90   Resp: 18 18 18 18   Temp: 97 6 °F (36 4 °C) 98 7 °F (37 1 °C) 98 4 °F (36 9 °C) 98 5 °F (36 9 °C)   TempSrc: Oral Oral Oral Oral   SpO2:   98% 98%   Weight:       Height:           Physical Exam:     AAOx3, NAD  CV, RRR  CTA b/l, no WRR  Soft, non-tender, non-distended, no rebound or guarding  Uterine fundus firm and non-tender, at the umbilicus     Non tender, negative Peyton's bilaterally    Lab, Imaging and other studies: I have personally reviewed pertinent reports        Lab Results   Component Value Date    WBC 15 27 (H) 10/20/2017    HGB 13 0 10/20/2017    HCT 38 5 10/20/2017    MCV 91 10/20/2017     10/20/2017               Asif Spencer MD  10/21/17

## 2017-10-21 NOTE — PLAN OF CARE
Knowledge Deficit     Verbalizes understanding of labor plan Completed     Patient/family/caregiver demonstrates understanding of disease process, treatment plan, medications, and discharge instructions Completed          DISCHARGE PLANNING     Discharge to home or other facility with appropriate resources Progressing        INFECTION - ADULT     Absence or prevention of progression during hospitalization Progressing     Absence of fever/infection during neutropenic period Progressing        PAIN - ADULT     Verbalizes/displays adequate comfort level or baseline comfort level Progressing        SAFETY ADULT     Patient will remain free of falls Progressing     Maintain or return to baseline ADL function Progressing     Maintain or return mobility status to optimal level Progressing

## 2017-10-22 VITALS
WEIGHT: 149 LBS | BODY MASS INDEX: 24.83 KG/M2 | HEIGHT: 65 IN | DIASTOLIC BLOOD PRESSURE: 60 MMHG | RESPIRATION RATE: 18 BRPM | HEART RATE: 88 BPM | TEMPERATURE: 98.4 F | OXYGEN SATURATION: 98 % | SYSTOLIC BLOOD PRESSURE: 110 MMHG

## 2017-10-22 RX ORDER — DIAPER,BRIEF,INFANT-TODD,DISP
1 EACH MISCELLANEOUS AS NEEDED
Qty: 30 G | Refills: 0
Start: 2017-10-22 | End: 2018-03-30 | Stop reason: ALTCHOICE

## 2017-10-22 RX ORDER — DOCUSATE SODIUM 100 MG/1
100 CAPSULE, LIQUID FILLED ORAL 2 TIMES DAILY
Qty: 10 CAPSULE | Refills: 0
Start: 2017-10-22 | End: 2018-03-30 | Stop reason: ALTCHOICE

## 2017-10-22 RX ORDER — ACETAMINOPHEN 325 MG/1
TABLET ORAL
Qty: 30 TABLET | Refills: 0
Start: 2017-10-22

## 2017-10-22 RX ADMIN — IBUPROFEN 600 MG: 600 TABLET ORAL at 00:13

## 2017-10-22 NOTE — PLAN OF CARE
DISCHARGE PLANNING     Discharge to home or other facility with appropriate resources Progressing        INFECTION - ADULT     Absence or prevention of progression during hospitalization Progressing        PAIN - ADULT     Verbalizes/displays adequate comfort level or baseline comfort level Progressing        SAFETY ADULT     Patient will remain free of falls Progressing     Maintain or return to baseline ADL function Progressing     Maintain or return mobility status to optimal level Progressing

## 2017-10-22 NOTE — PROGRESS NOTES
Progress Note - OB/GYN   Silviano Robertson 28 y o  female MRN: [id number]  Unit/Bed#: -01 Encounter: [id number]    Assessment:  Term pregnancy, s/p , PPD#2    Plan:  Continue routine postpartum care  Pt desires Nexplanon for contraception, declines contraceptive bridge  Discharge to home    Subjective/Objective   Chief Complaint: I'm okay    Subjective: Pt reports feeling well today  Ambulating  Tolerating PO  Voiding without difficulty  No BM yet but passing flatus  Lochia improving  Denies HA, CP, SOB, extremity pain  Objective:   Vitals: Blood pressure 110/60, pulse 88, temperature 98 4 °F (36 9 °C), temperature source Oral, resp  rate 18, height 5' 5" (1 651 m), weight 67 6 kg (149 lb), last menstrual period 2017, SpO2 98 %, currently breastfeeding  ,Body mass index is 24 79 kg/m²  No intake or output data in the 24 hours ending 10/22/17 1038    Invasive Devices          No matching active lines, drains, or airways          Physical Exam:   Gen: AaOx3, NAD, pleasant  Abd: Soft, nontender, nondistended  : Fundus firm, nontender, located at -1 cm below umbilicus  Extremities: No edema, nontender, Negative Peyton's bilaterally      Lab, Imaging and other studies: I have personally reviewed pertinent reports              Nba De DO  OB/GYN, PGY2  10/22/2017, 10:40 AM

## 2017-10-22 NOTE — LACTATION NOTE
This note was copied from a baby's chart  Mom has been bottle feeding formula  States "I have no milk yet" Discussed colostrum and the need to offer breast to establish supply  Encouraged her to offer breast before giving formula  Given discharge breastfeeding jamal and kristen of feeding log reviewed  Discussed engorgement relief measures and where to call for additional assistance as needed

## 2017-10-24 ENCOUNTER — GENERIC CONVERSION - ENCOUNTER (OUTPATIENT)
Dept: OTHER | Facility: OTHER | Age: 27
End: 2017-10-24

## 2017-10-30 LAB — PLACENTA IN STORAGE: NORMAL

## 2017-11-09 ENCOUNTER — GENERIC CONVERSION - ENCOUNTER (OUTPATIENT)
Dept: OTHER | Facility: OTHER | Age: 27
End: 2017-11-09

## 2017-11-09 ENCOUNTER — ALLSCRIPTS OFFICE VISIT (OUTPATIENT)
Dept: OTHER | Facility: OTHER | Age: 27
End: 2017-11-09

## 2017-11-30 ENCOUNTER — GENERIC CONVERSION - ENCOUNTER (OUTPATIENT)
Dept: OTHER | Facility: OTHER | Age: 27
End: 2017-11-30

## 2017-12-06 ENCOUNTER — GENERIC CONVERSION - ENCOUNTER (OUTPATIENT)
Dept: OTHER | Facility: OTHER | Age: 27
End: 2017-12-06

## 2017-12-06 LAB — HCG, QUALITATIVE (HISTORICAL): NEGATIVE

## 2018-01-11 NOTE — MISCELLANEOUS
Reason For Visit  Reason For Visit Free Text Note Form: SW MET WITH PATIENT FOR FOLLOW UP  PATIENT REPORTED HAS NEEDED ITEMS FOR UNBORN BABY  AWARE OF POST PARTUM DEPRESSION SIGNS  NO CONCERN AT THIS TIME  WILL FOLLOW UP AS NEEDED  Active Problems    1  Candidal vulvovaginitis (112 1) (B37 3)   2  Nausea and vomiting in pregnancy (643 90) (O21 9)   3  Vaginal discharge (623 5) (N89 8)    Current Meds   1  [facility] Prenatal 28-0 8 MG Oral Tablet; Therapy: (Recorded:13Mar2017) to Recorded    Allergies    1  No Known Drug Allergies    2  No Known Environmental Allergies   3   No Known Food Allergies    Signatures   Electronically signed by : CROW Alexander; Oct 19 2017 12:00PM EST                       (Author)

## 2018-01-12 VITALS
SYSTOLIC BLOOD PRESSURE: 97 MMHG | HEIGHT: 65 IN | BODY MASS INDEX: 20.67 KG/M2 | DIASTOLIC BLOOD PRESSURE: 54 MMHG | WEIGHT: 124.05 LBS

## 2018-01-12 NOTE — PROGRESS NOTES
Assessment    1  Pregnancy (V22 2) (Z33 1)   2  Rhinorrhea (478 19) (J34 89)    Plan  Pregnancy    · (1) BASIC METABOLIC PROFILE; Status:Active; Requested for:2017;    Perform:[facility]; UWK:[id number]; Ordered; For:Pregnancy; Ordered By:Gamaliel Chambers;   · (1) CBC/PLT/DIFF; Status:Active; Requested for:2017;    Perform:[facility]; OOK:[id number]; Ordered; For:Pregnancy; Ordered By:Gamaliel Chambers; Rhinorrhea    · Oseltamivir Phosphate 75 MG Oral Capsule (Tamiflu); TAKE 1 CAPSULE TWICE  DAILY WITH MEALS   Rx By: Maryse Dial; Dispense: 5 Days ; #:10 Capsule; Refill: 0; For: Rhinorrhea; MARLON = N; Verified [id number] Last Updated By: [employer]; 2017 11:45:53 AM    Discussion/Summary  Discussion Summary:   30 y/o   1) IUP confirmed with TVUS  Fetal pole still smal for CRL measuring and dating   Will RTC for nurse intake and H+P  2) Mylagias, headache, fever, chills, rhinorrhea, cough: Suspected influenza  Nasopharyngeal swab collected and sent  Symptoms started >48 h from presentation but will start tamiflu 75 mg twice daily for 5 days since benefits outweight risks in pregnancy  Pt has had some N/V and is slightly tachycardic today, but she reports she has tolerated diet today and feels she can keep good hydration at home  Mucus membranes are moist  Will check CBC and BMP  Pt doesn't appear severely sick or dehydrated  WIll send home with recommendations to rest and symptomatic treatment  Tylenol safe to avoid hyperemia, which should be avoided in early pregnancy  Note for work given  Advised to have family take daughter to pediatrician as she is also symptomatic  She has been counseled on mode of transmission  WIll RTC in 3 days and call with any questions  Flu vaccine will be given        Chief Complaint  Chief Complaint Free Text Note Form: PN pt here (has not had interview or H&P yet) pt c/o nausea, vomiting, headaches, body aches, pelvic pain, sore throat, fever, and runny nose  History of Present Illness  HPI: 32 y/o  () with pregnnacy of unknown dates LMP 17 ( approx 6 5 weeks but pt has irregular periods) pt c/o nausea, vomiting, headaches, body aches, right pelvic pain, sore throat, fever (highst temp at home 100 4), chills and rhinorrhea since friday  Denies SOB, hemoptysis chest pain or urinary complaints  Denies flu vaccine this season  Pt reports  and child with similar symptoms  Tamara kilgore seen daughter and  is imrpoving but was never seen by doctor  Active Problems    1  Contraception (V25 9) (Z30 9)   2  Nausea and vomiting in pregnancy (643 90) (O21 9)   3  Pregnancy (V22 2) (Z33 1)   4  Urinary tract infection (599 0) (N39 0)    Family History  Mother    1  No pertinent family history  Paternal Grandmother    2  Family history of Heart disease    Social History    · Denied: History of Alcohol use   · Denied: History of Drug use   · Never a smoker    Current Meds   1  [facility] Prenatal 28-0 8 MG Oral Tablet; Therapy: (Recorded:38Jdq9155) to Recorded    Allergies    1  No Known Drug Allergies    2  No Known Environmental Allergies   3  No Known Food Allergies    Vitals  Vital Signs    Recorded: 06Sby9499 10:54AM   Temperature 100 1 F, Tympanic   Heart Rate 074   Systolic 99   Diastolic 67   Height 5 ft 5 in   Weight 126 lb    BMI Calculated 20 97   BSA Calculated 1 63     Physical Exam    Constitutional   General appearance: No acute distress, well appearing and well nourished  No acute distress  Pulmonary   Respiratory effort: No increased work of breathing or signs of respiratory distress  Auscultation of lungs: Clear to auscultation  CTAB, Pulse ox 100%  Cardiovascular Tachycardia, no murmur, no gallops  Peripheral vascular exam: Normal pulses Throughout   strong pulse     Psychiatric   Orientation to person, place, and time: Normal        Message  Return to work or school: Italo Ken is under my professional care  She was seen in my office on 2/27/17    She is not able to return to work until 3/6/17      [employer]        Future Appointments    Date/Time Provider Specialty Site   03/13/2017 09:00 AM [address] [facility], [address]     Signatures   Electronically signed by : Eloisa Kehr, M D ; Feb 27 2017 11:53AM EST                       (Author)    Electronically signed by : Eloisa Kehr, M D ; Feb 27 2017 11:54AM EST                       (Author)

## 2018-01-13 VITALS
BODY MASS INDEX: 20.66 KG/M2 | SYSTOLIC BLOOD PRESSURE: 97 MMHG | WEIGHT: 124 LBS | HEIGHT: 65 IN | DIASTOLIC BLOOD PRESSURE: 63 MMHG | TEMPERATURE: 96.8 F

## 2018-01-13 NOTE — MISCELLANEOUS
Reason For Visit  Reason For Visit Free Text Note Form: RO MET WITH PATIENT AND PROVIDER TO ASSIST WITH INTERPRETATION  PATIENT REPORTED IS DOING WELL  NO CONCERN  Active Problems    1  Candidal vulvovaginitis (112 1) (B37 3)   2  Encounter for pregnancy related examination, third trimester (V22 1) (Z34 93)   3  Nausea and vomiting in pregnancy (643 90) (O21 9)   4  Need for Tdap vaccination (V06 1) (Z23)   5  Pregnancy (V22 2) (Z34 90)   6  Vaginal discharge (623 5) (N89 8)    Current Meds   1  CVS Prenatal 28-0 8 MG Oral Tablet; Therapy: (Recorded:2017) to Recorded   2  Metoclopramide HCl - 5 MG Oral Tablet; TAKE 1 TABLET 30 MINUTES BEFORE MEALS   AND AT BEDTIME; Therapy: 85MDL3718 to (Evaluate:2017)  Requested for: 75MTH5311; Last   Rx:2017 Ordered   3  Terconazole 0 4 % Vaginal Cream; INSERT 1 APPLICATORFUL INTRAVAGINALLY AT   BEDTIME; Therapy: 48OQX8087 to (Last Rx:2017)  Requested for: 2017 Ordered   4  Unisom 25 MG Oral Tablet; TAKE 0 5 TABLET 3 TIMES DAILY; Therapy: 82TTP2264 to (Evaluate:2017) Recorded   5  Vitamin B-6 25 MG Oral Tablet; TAKE 1 TABLET 3 times daily; Therapy: 47YYF4948 to Recorded    Allergies    1  No Known Drug Allergies    2  No Known Environmental Allergies   3   No Known Food Allergies    Future Appointments    Date/Time Provider Specialty Site   2017 08:00 AM [facility], [facility] [address]   2017 11:15 AM [facility] Gyn Chief, Schedule  [facility]  [address]     Signatures   Electronically signed by : CROW John; Sep  7 2017  3:10PM EST                       (Author)

## 2018-01-14 VITALS
HEIGHT: 65 IN | DIASTOLIC BLOOD PRESSURE: 62 MMHG | BODY MASS INDEX: 24.5 KG/M2 | WEIGHT: 147.04 LBS | SYSTOLIC BLOOD PRESSURE: 111 MMHG

## 2018-01-14 VITALS
SYSTOLIC BLOOD PRESSURE: 99 MMHG | BODY MASS INDEX: 20.99 KG/M2 | TEMPERATURE: 100.1 F | WEIGHT: 126 LBS | DIASTOLIC BLOOD PRESSURE: 67 MMHG | HEART RATE: 116 BPM | HEIGHT: 65 IN

## 2018-01-14 VITALS
WEIGHT: 136.4 LBS | HEIGHT: 65 IN | BODY MASS INDEX: 22.73 KG/M2 | SYSTOLIC BLOOD PRESSURE: 101 MMHG | DIASTOLIC BLOOD PRESSURE: 56 MMHG

## 2018-01-15 NOTE — MISCELLANEOUS
Message  Spontaneous vaginal delivery  -no current concerns  -breast feeding  - 3 weeks postpartum appointment to be made      Active Problems    1  Candidal vulvovaginitis (112 1) (B37 3)   2  Nausea and vomiting in pregnancy (643 90) (O21 9)   3  Vaginal discharge (623 5) (N89 8)    Current Meds   1  CVS Prenatal 28-0 8 MG Oral Tablet; Therapy: (Recorded:13Mar2017) to Recorded    Allergies    1  No Known Drug Allergies    2  No Known Environmental Allergies   3   No Known Food Allergies    Signatures   Electronically signed by : Juan R Kaiser, ; Oct 24 2017  1:30PM EST                       (Author)

## 2018-01-15 NOTE — MISCELLANEOUS
Reason For Visit  Reason For Visit Free Text Note Form: PN PATIENT SEEN FOR ASSESS AND TO ASSIST WITH INTERPRETATION  Case Management Documentation [facility]:   Information obtained from the patient and medical record  The patient is receiving [address]  Patient's financial status employed  She is also dealing with additional issues such as language/communication barrier  Action Plan: information provided  Progress Note  SW MET WITH 26 Y/O- S- G2:P1-  [employer] SPEAKING WOMAN  PATIENT RESIDES WITH FOB AND DAUGHTER  PLANED PREGNANCY  PATIENT HAS COMMERCIAL INSURANCE  DENIES ANY USAGE OF DRUG, ALCOHOL OR SMOKING  NO MENTAL HEALTH HISTORY OR DOMESTIC VIOLENCE ISSUES  PATIENT PRESENT COOPERATIVE  DENIES SOCIAL ISSUES AT PRESENT TIME  PATIENT C/O HA, RELIEF WITH TYLENOL  PATIENT CONCERN REGARDING UNBORN BABY GROWTH  PATIENT WAS REASSURE BY PROVIDER GROWTH IS WNL AND WILL BE ABLE TO LEARN MORE AFTER LEVEL 2 U/S TOMORROW  PATIENT VERBALIZED UNDERSTANDING  WILL CONTACT SW AS NEEDED  Active Problems    1  Encounter for annual routine gynecological examination (V72 31) (Z01 419)   2  Nausea and vomiting in pregnancy (643 90) (O21 9)   3  Prenatal care in first trimester (V22 1) (Z34 91)   4  Routine screening for STI (sexually transmitted infection) (V74 5) (Z11 3)    Current Meds   1  CVS Prenatal 28-0 8 MG Oral Tablet; Therapy: (Recorded:13Mar2017) to Recorded   2  Metoclopramide HCl - 5 MG Oral Tablet; TAKE 1 TABLET 30 MINUTES BEFORE MEALS   AND AT BEDTIME; Therapy: 34QIA4714 to (Evaluate:04Jun2017)  Requested for: 29DRN1880; Last   Rx:27Mar2017 Ordered   3  Unisom 25 MG Oral Tablet; TAKE 0 5 TABLET 3 TIMES DAILY; Therapy: 52MVC2311 to (Evaluate:01Jun2017) Recorded   4  Vitamin B-6 25 MG Oral Tablet; TAKE 1 TABLET 3 times daily; Therapy: 60DFN0583 to Recorded   5  Zofran 4 MG Oral Tablet (Ondansetron HCl); Therapy: 19KOJ2917 to Recorded    Allergies    1  No Known Drug Allergies    2   No Known Environmental Allergies   3   No Known Food Allergies    Future Appointments    Date/Time Provider Specialty Site   2017 09:30 AM  [place], [facility] [address]   2017 10:00 AM [address] [facility], NP [facility]  [facility] [address]     Signatures   Electronically signed by : CROW Alexander; 2017 11:26AM EST                       (Author)

## 2018-01-16 NOTE — MISCELLANEOUS
Message  Return to work or school:   Italo Rogers is under my professional care   She was seen in my office on 2/27/17    She is not able to return to work until 3/6/17      Orquidea Stevenson       Signatures   Electronically signed by : Eloisa Kehr, M D ; Feb 27 2017 11:56AM EST                       (Author)

## 2018-01-22 VITALS
WEIGHT: 146 LBS | DIASTOLIC BLOOD PRESSURE: 64 MMHG | HEIGHT: 65 IN | BODY MASS INDEX: 24.32 KG/M2 | HEART RATE: 87 BPM | SYSTOLIC BLOOD PRESSURE: 112 MMHG

## 2018-01-22 VITALS
SYSTOLIC BLOOD PRESSURE: 109 MMHG | DIASTOLIC BLOOD PRESSURE: 69 MMHG | WEIGHT: 135.5 LBS | BODY MASS INDEX: 22.57 KG/M2 | HEIGHT: 65 IN

## 2018-01-22 VITALS
WEIGHT: 145 LBS | HEART RATE: 96 BPM | DIASTOLIC BLOOD PRESSURE: 70 MMHG | SYSTOLIC BLOOD PRESSURE: 107 MMHG | HEIGHT: 65 IN | BODY MASS INDEX: 24.16 KG/M2

## 2018-01-22 VITALS
SYSTOLIC BLOOD PRESSURE: 112 MMHG | DIASTOLIC BLOOD PRESSURE: 58 MMHG | WEIGHT: 146.25 LBS | BODY MASS INDEX: 24.37 KG/M2 | HEIGHT: 65 IN

## 2018-01-22 VITALS
WEIGHT: 128 LBS | HEART RATE: 86 BPM | HEIGHT: 65 IN | DIASTOLIC BLOOD PRESSURE: 59 MMHG | BODY MASS INDEX: 21.33 KG/M2 | SYSTOLIC BLOOD PRESSURE: 96 MMHG

## 2018-01-22 VITALS
WEIGHT: 123.38 LBS | HEIGHT: 65 IN | SYSTOLIC BLOOD PRESSURE: 103 MMHG | BODY MASS INDEX: 20.55 KG/M2 | DIASTOLIC BLOOD PRESSURE: 65 MMHG

## 2018-01-22 VITALS
DIASTOLIC BLOOD PRESSURE: 70 MMHG | WEIGHT: 141.6 LBS | SYSTOLIC BLOOD PRESSURE: 115 MMHG | HEIGHT: 65 IN | HEART RATE: 89 BPM | BODY MASS INDEX: 23.59 KG/M2

## 2018-01-22 VITALS
WEIGHT: 140 LBS | HEIGHT: 65 IN | SYSTOLIC BLOOD PRESSURE: 110 MMHG | DIASTOLIC BLOOD PRESSURE: 70 MMHG | WEIGHT: 139 LBS | HEIGHT: 65 IN | BODY MASS INDEX: 23.16 KG/M2 | BODY MASS INDEX: 23.32 KG/M2 | SYSTOLIC BLOOD PRESSURE: 113 MMHG | DIASTOLIC BLOOD PRESSURE: 77 MMHG | HEART RATE: 85 BPM

## 2018-01-22 VITALS
HEIGHT: 65 IN | DIASTOLIC BLOOD PRESSURE: 64 MMHG | BODY MASS INDEX: 24.32 KG/M2 | SYSTOLIC BLOOD PRESSURE: 100 MMHG | WEIGHT: 146 LBS

## 2018-01-22 VITALS
BODY MASS INDEX: 20.12 KG/M2 | SYSTOLIC BLOOD PRESSURE: 90 MMHG | HEIGHT: 65 IN | DIASTOLIC BLOOD PRESSURE: 64 MMHG | WEIGHT: 120.8 LBS

## 2018-01-22 VITALS
HEIGHT: 65 IN | WEIGHT: 137.25 LBS | SYSTOLIC BLOOD PRESSURE: 102 MMHG | BODY MASS INDEX: 22.87 KG/M2 | DIASTOLIC BLOOD PRESSURE: 65 MMHG

## 2018-01-22 VITALS
DIASTOLIC BLOOD PRESSURE: 66 MMHG | SYSTOLIC BLOOD PRESSURE: 117 MMHG | HEIGHT: 65 IN | BODY MASS INDEX: 22.99 KG/M2 | WEIGHT: 138 LBS

## 2018-01-22 VITALS
DIASTOLIC BLOOD PRESSURE: 69 MMHG | HEIGHT: 65 IN | BODY MASS INDEX: 24.83 KG/M2 | WEIGHT: 149 LBS | SYSTOLIC BLOOD PRESSURE: 104 MMHG

## 2018-01-22 VITALS
BODY MASS INDEX: 19.83 KG/M2 | SYSTOLIC BLOOD PRESSURE: 98 MMHG | WEIGHT: 119 LBS | HEIGHT: 65 IN | DIASTOLIC BLOOD PRESSURE: 70 MMHG

## 2018-01-22 VITALS
BODY MASS INDEX: 19.99 KG/M2 | HEIGHT: 65 IN | SYSTOLIC BLOOD PRESSURE: 107 MMHG | DIASTOLIC BLOOD PRESSURE: 63 MMHG | WEIGHT: 120 LBS

## 2018-01-22 VITALS
DIASTOLIC BLOOD PRESSURE: 65 MMHG | HEIGHT: 65 IN | WEIGHT: 143 LBS | BODY MASS INDEX: 23.82 KG/M2 | SYSTOLIC BLOOD PRESSURE: 108 MMHG

## 2018-03-07 NOTE — PROGRESS NOTES
Education  [employer] Education 1st Trimester:   First Trimester Education provided:   benefits of breastfeeding, importance of exclusive breastfeeding, early initiation of breastfeeding and exclusive breastfeeding for the first 6 months   The patient is planning on breastfeeding        Signatures   Electronically signed by : Myah Pro RN; Mar 13 2017 10:51AM EST                       (Author)

## 2018-03-30 ENCOUNTER — OFFICE VISIT (OUTPATIENT)
Dept: URGENT CARE | Age: 28
End: 2018-03-30
Payer: COMMERCIAL

## 2018-03-30 ENCOUNTER — APPOINTMENT (OUTPATIENT)
Dept: RADIOLOGY | Age: 28
End: 2018-03-30
Attending: PHYSICIAN ASSISTANT
Payer: COMMERCIAL

## 2018-03-30 VITALS
DIASTOLIC BLOOD PRESSURE: 79 MMHG | SYSTOLIC BLOOD PRESSURE: 117 MMHG | HEART RATE: 72 BPM | RESPIRATION RATE: 16 BRPM | TEMPERATURE: 98 F | WEIGHT: 135 LBS | OXYGEN SATURATION: 99 % | BODY MASS INDEX: 22.47 KG/M2

## 2018-03-30 DIAGNOSIS — R05.9 COUGH: ICD-10-CM

## 2018-03-30 DIAGNOSIS — R13.10 DYSPHAGIA, UNSPECIFIED TYPE: Primary | ICD-10-CM

## 2018-03-30 DIAGNOSIS — R10.12 LEFT UPPER QUADRANT PAIN: ICD-10-CM

## 2018-03-30 PROCEDURE — 71046 X-RAY EXAM CHEST 2 VIEWS: CPT

## 2018-03-30 PROCEDURE — 99213 OFFICE O/P EST LOW 20 MIN: CPT | Performed by: FAMILY MEDICINE

## 2018-03-30 RX ORDER — RANITIDINE 150 MG/1
150 TABLET ORAL 2 TIMES DAILY
Qty: 40 TABLET | Refills: 0 | Status: SHIPPED | OUTPATIENT
Start: 2018-03-30 | End: 2018-12-21

## 2018-03-30 RX ORDER — OMEPRAZOLE 20 MG/1
20 CAPSULE, DELAYED RELEASE ORAL DAILY
Qty: 20 CAPSULE | Refills: 0 | Status: SHIPPED | OUTPATIENT
Start: 2018-03-30 | End: 2018-12-21

## 2018-03-30 NOTE — PROGRESS NOTES
[address] [employer]        NAME: Cherelle Dudley is a 28 y o  female  : 1990    MRN: [id number]  DATE: 2018  TIME: 5:32 PM    Assessment and Plan   Dysphagia, unspecified type [R13 10]  1  Dysphagia, unspecified type  omeprazole (PriLOSEC) 20 mg delayed release capsule    ranitidine (ZANTAC) 150 mg tablet   2  Cough  XR chest pa & lateral   3  Left upper quadrant pain       CXR:  No acute changes or infiltrates noted  Patient Instructions     Patient Instructions   1  Take omeprazole and Ranitidine as directed for sore throat / pain with swallowing  2   Avoid spicy, greasy foods  Avoid caffeine products    3  Avoid milk and cheese products  4   Follow up with family practice medical provider at scheduled appointment  Chief Complaint     Chief Complaint   Patient presents with    Sore Throat     sore throat for 4 years has had it checked elsalvador but has never gotten better     Abdominal Pain     pain left side          History of Present Illness   Cherelle Dudley presents to the clinic c/o    54-year-old female comes in for evaluation of discomfort in her throat that has been off and on for the last 4 years  She was last seen for this while in [place] and told she had a bacteria and treated with an unknown medicine  She says that it hurts to swallow food and liquids  She also has some discomfort in her chest and upper abdomen  Upper abdominal pain and some watery stools for the last week  No blood in stool  No recent travel or antibiotics  Patient reports that she has an appointment with the family practice doctor in mid April  Has Implanon for birth control  Review of Systems   Review of Systems   Constitutional: Negative  HENT: Positive for sore throat  Negative for congestion, rhinorrhea and sinus pain  Eyes: Negative  Respiratory: Positive for cough  Cardiovascular: Positive for chest pain  Negative for palpitations and leg swelling  Gastrointestinal: Positive for abdominal pain and diarrhea  Negative for constipation, nausea and vomiting  Genitourinary: Negative  Neurological: Negative  Current Medications     Long-Term Prescriptions   Medication Sig Dispense Refill    omeprazole (PriLOSEC) 20 mg delayed release capsule Take 1 capsule (20 mg total) by mouth daily 20 capsule 0    ranitidine (ZANTAC) 150 mg tablet Take 1 tablet (150 mg total) by mouth 2 (two) times a day 40 tablet 0       Current Allergies     Allergies as of 03/30/2018    (No Known Allergies)            The following portions of the patient's history were reviewed and updated as appropriate: allergies, current medications, past family history, past medical history, past social history, past surgical history and problem list     Objective   /79 (BP Location: Right arm, Patient Position: Sitting, Cuff Size: Standard)   Pulse 72   Temp 98 °F (36 7 °C) (Temporal)   Resp 16   Wt 61 2 kg (135 lb)   LMP 03/30/2018   SpO2 99%   BMI 22 47 kg/m²        Physical Exam     Physical Exam   Constitutional: She is oriented to person, place, and time  She appears well-developed and well-nourished  No distress  HENT:   Head: Normocephalic and atraumatic  Right Ear: External ear normal    Left Ear: External ear normal    Nose: Nose normal    Mouth/Throat: Oropharynx is clear and moist  No oropharyngeal exudate  Eyes: Conjunctivae are normal  Pupils are equal, round, and reactive to light  Right eye exhibits no discharge  Left eye exhibits discharge  No scleral icterus  Neck: Normal range of motion  Neck supple  No tracheal deviation present  No thyromegaly present  Cardiovascular: Normal rate, regular rhythm, normal heart sounds and intact distal pulses  Exam reveals no gallop and no friction rub  No murmur heard  Pulmonary/Chest: Breath sounds normal  No respiratory distress  She has no wheezes  She has no rales  Abdominal: Soft   Bowel sounds are normal  She exhibits no distension, no fluid wave and no mass  There is no hepatosplenomegaly  There is tenderness in the left upper quadrant  There is no rigidity, no CVA tenderness, no tenderness at McBurney's point and negative Lopez's sign  No hernia  Lymphadenopathy:     She has no cervical adenopathy  Neurological: She is alert and oriented to person, place, and time  Skin: Skin is warm and dry  She is not diaphoretic  Psychiatric: She has a normal mood and affect  Nursing note and vitals reviewed

## 2018-03-30 NOTE — PATIENT INSTRUCTIONS
1   Take omeprazole and Ranitidine as directed for sore throat / pain with swallowing  May also help with abdominal pain  2   Avoid spicy, greasy foods  Avoid caffeine products    3  Avoid milk and cheese products  4   Follow up with family practice medical provider at scheduled appointment

## 2018-08-30 ENCOUNTER — OFFICE VISIT (OUTPATIENT)
Dept: FAMILY MEDICINE CLINIC | Facility: CLINIC | Age: 28
End: 2018-08-30
Payer: COMMERCIAL

## 2018-08-30 VITALS
DIASTOLIC BLOOD PRESSURE: 72 MMHG | HEIGHT: 64 IN | RESPIRATION RATE: 16 BRPM | TEMPERATURE: 98.7 F | BODY MASS INDEX: 23.18 KG/M2 | HEART RATE: 76 BPM | WEIGHT: 135.8 LBS | OXYGEN SATURATION: 98 % | SYSTOLIC BLOOD PRESSURE: 102 MMHG

## 2018-08-30 DIAGNOSIS — Z13.1 SCREENING FOR DIABETES MELLITUS: ICD-10-CM

## 2018-08-30 DIAGNOSIS — R13.10 DYSPHAGIA, UNSPECIFIED TYPE: ICD-10-CM

## 2018-08-30 DIAGNOSIS — Z13.220 SCREENING CHOLESTEROL LEVEL: ICD-10-CM

## 2018-08-30 DIAGNOSIS — Z23 NEEDS FLU SHOT: ICD-10-CM

## 2018-08-30 DIAGNOSIS — J30.89 ALLERGIC RHINITIS DUE TO OTHER ALLERGIC TRIGGER, UNSPECIFIED SEASONALITY: ICD-10-CM

## 2018-08-30 DIAGNOSIS — Z00.00 HEALTHCARE MAINTENANCE: Primary | ICD-10-CM

## 2018-08-30 PROBLEM — J30.9 ALLERGIC RHINITIS: Status: ACTIVE | Noted: 2018-08-30

## 2018-08-30 PROCEDURE — 90686 IIV4 VACC NO PRSV 0.5 ML IM: CPT

## 2018-08-30 PROCEDURE — 99385 PREV VISIT NEW AGE 18-39: CPT | Performed by: FAMILY MEDICINE

## 2018-08-30 NOTE — PROGRESS NOTES
Assessment/Plan:    No problem-specific Assessment & Plan notes found for this encounter  Diagnoses and all orders for this visit:    Healthcare maintenance  -     HEMOGLOBIN A1C W/ EAG ESTIMATION; Future  -     TSH, 3rd generation with Free T4 reflex; Future  -     Basic metabolic panel; Future  -     Lipid Panel with Direct LDL reflex; Future    Allergic rhinitis due to other allergic trigger, unspecified seasonality  -     HEMOGLOBIN A1C W/ EAG ESTIMATION; Future  -     TSH, 3rd generation with Free T4 reflex; Future  -     Basic metabolic panel; Future  -     Lipid Panel with Direct LDL reflex; Future    Dysphagia, unspecified type  -     US thyroid; Future  -     HEMOGLOBIN A1C W/ EAG ESTIMATION; Future  -     TSH, 3rd generation with Free T4 reflex; Future  -     Basic metabolic panel; Future  -     Lipid Panel with Direct LDL reflex; Future    Screening for diabetes mellitus  -     HEMOGLOBIN A1C W/ EAG ESTIMATION; Future  -     TSH, 3rd generation with Free T4 reflex; Future  -     Basic metabolic panel; Future  -     Lipid Panel with Direct LDL reflex; Future    Screening cholesterol level  -     HEMOGLOBIN A1C W/ EAG ESTIMATION; Future  -     TSH, 3rd generation with Free T4 reflex; Future  -     Basic metabolic panel; Future  -     Lipid Panel with Direct LDL reflex; Future    Needs flu shot  -     influenza vaccine, [id number], quadrivalent, 0 5 mL, preservative-free (SYRINGE, SINGLE-DOSE VIAL), for adult and pediatric patients 3 yr+ (AFLURIA, FLUARIX, FLULAVAL, FLUZONE)        get studies, will call with results  HM in 1 year if stable or PRN if dysphagia persist     Subjective:      Patient ID: Melissa Montiel is a 33 y o  female  HPI    New pt presents for a physical    She is   She lives with her boyfriend and her 2 girls  She works fulltime at [employer]  She has nexplanon for contraception       She is interested in screening labs    She complains of month of trouble swallowing, she states she gets pain for couple day with hoarseness but resolves spontaneously  She admits to itching but states is in the outside, not in her throat  She denies any cough, congestion, sneezing or any other allergy sxs  Back in march she was given PPI and zantac for similar sxs at an UC  She took for a month without improvement of her sxs  The following portions of the patient's history were reviewed and updated as appropriate: allergies, current medications, past family history, past medical history, past social history, past surgical history and problem list     Review of Systems   Constitutional: Negative for activity change and appetite change  HENT: Positive for trouble swallowing  Respiratory: Negative  Cardiovascular: Negative  Gastrointestinal: Negative  Genitourinary: Negative  Musculoskeletal: Negative for arthralgias  Skin: Negative for rash  Psychiatric/Behavioral: Negative  Objective:      /72   Pulse 76   Temp 98 7 °F (37 1 °C)   Resp 16   Ht 5' 3 58" (1 615 m)   Wt 61 6 kg (135 lb 12 8 oz)   LMP 08/30/2018   SpO2 98%   BMI 23 62 kg/m²          Physical Exam   Constitutional: She is oriented to person, place, and time  She appears well-developed and well-nourished  No distress  HENT:   Head: Normocephalic  Right Ear: External ear normal    Left Ear: External ear normal    Mouth/Throat: Uvula is midline, oropharynx is clear and moist and mucous membranes are normal    Eyes: Conjunctivae are normal    Neck: Thyromegaly (right side, bilateral tenderness) present  Cardiovascular: Normal rate, regular rhythm and normal heart sounds  Pulmonary/Chest: Effort normal and breath sounds normal  No respiratory distress  She has no wheezes  She has no rales  Abdominal: Soft  Bowel sounds are normal  She exhibits no distension  Neurological: She is alert and oriented to person, place, and time  No cranial nerve deficit     Psychiatric: She has a normal mood and affect   Her behavior is normal

## 2018-09-10 ENCOUNTER — HOSPITAL ENCOUNTER (OUTPATIENT)
Dept: RADIOLOGY | Facility: HOSPITAL | Age: 28
Discharge: HOME | End: 2018-09-10
Payer: COMMERCIAL

## 2018-09-10 ENCOUNTER — APPOINTMENT (OUTPATIENT)
Dept: LAB | Facility: HOSPITAL | Age: 28
End: 2018-09-10
Payer: COMMERCIAL

## 2018-09-10 ENCOUNTER — TRANSCRIBE ORDERS (OUTPATIENT)
Dept: LAB | Facility: HOSPITAL | Age: 28
End: 2018-09-10

## 2018-09-10 DIAGNOSIS — Z13.1 SCREENING FOR DIABETES MELLITUS: ICD-10-CM

## 2018-09-10 DIAGNOSIS — R13.10 DYSPHAGIA, UNSPECIFIED TYPE: ICD-10-CM

## 2018-09-10 DIAGNOSIS — J30.89 ALLERGIC RHINITIS DUE TO OTHER ALLERGIC TRIGGER, UNSPECIFIED SEASONALITY: ICD-10-CM

## 2018-09-10 DIAGNOSIS — Z00.00 HEALTHCARE MAINTENANCE: ICD-10-CM

## 2018-09-10 DIAGNOSIS — Z13.220 SCREENING CHOLESTEROL LEVEL: ICD-10-CM

## 2018-09-10 LAB
ANION GAP SERPL CALCULATED.3IONS-SCNC: 7 MMOL/L (ref 4–13)
BUN SERPL-MCNC: 14 MG/DL (ref 5–25)
CALCIUM SERPL-MCNC: 8.7 MG/DL (ref 8.3–10.1)
CHLORIDE SERPL-SCNC: 106 MMOL/L (ref 100–108)
CHOLEST SERPL-MCNC: 118 MG/DL (ref 50–200)
CO2 SERPL-SCNC: 24 MMOL/L (ref 21–32)
CREAT SERPL-MCNC: 0.69 MG/DL (ref 0.6–1.3)
EST. AVERAGE GLUCOSE BLD GHB EST-MCNC: 108 MG/DL
GFR SERPL CREATININE-BSD FRML MDRD: 118 ML/MIN/1.73SQ M
GLUCOSE P FAST SERPL-MCNC: 90 MG/DL (ref 65–99)
HBA1C MFR BLD: 5.4 % (ref 4.2–6.3)
HDLC SERPL-MCNC: 29 MG/DL (ref 40–60)
LDLC SERPL CALC-MCNC: 47 MG/DL (ref 0–100)
POTASSIUM SERPL-SCNC: 3.5 MMOL/L (ref 3.5–5.3)
SODIUM SERPL-SCNC: 137 MMOL/L (ref 136–145)
TRIGL SERPL-MCNC: 211 MG/DL
TSH SERPL DL<=0.05 MIU/L-ACNC: 1.39 UIU/ML (ref 0.36–3.74)

## 2018-09-10 PROCEDURE — 83036 HEMOGLOBIN GLYCOSYLATED A1C: CPT

## 2018-09-10 PROCEDURE — 80061 LIPID PANEL: CPT

## 2018-09-10 PROCEDURE — 84443 ASSAY THYROID STIM HORMONE: CPT

## 2018-09-10 PROCEDURE — 76536 US EXAM OF HEAD AND NECK: CPT

## 2018-09-10 PROCEDURE — 80048 BASIC METABOLIC PNL TOTAL CA: CPT

## 2018-09-10 PROCEDURE — 36415 COLL VENOUS BLD VENIPUNCTURE: CPT

## 2018-12-21 ENCOUNTER — OFFICE VISIT (OUTPATIENT)
Dept: OBGYN CLINIC | Facility: HOSPITAL | Age: 28
End: 2018-12-21
Payer: COMMERCIAL

## 2018-12-21 ENCOUNTER — PATIENT OUTREACH (OUTPATIENT)
Dept: OBGYN CLINIC | Facility: HOSPITAL | Age: 28
End: 2018-12-21

## 2018-12-21 VITALS
SYSTOLIC BLOOD PRESSURE: 122 MMHG | BODY MASS INDEX: 27 KG/M2 | DIASTOLIC BLOOD PRESSURE: 82 MMHG | WEIGHT: 143 LBS | HEIGHT: 61 IN | HEART RATE: 108 BPM

## 2018-12-21 DIAGNOSIS — Z01.419 WOMEN'S ANNUAL ROUTINE GYNECOLOGICAL EXAMINATION: Primary | ICD-10-CM

## 2018-12-21 DIAGNOSIS — Z78.9 LANGUAGE BARRIER TO COMMUNICATION: Primary | ICD-10-CM

## 2018-12-21 DIAGNOSIS — Z97.5 NEXPLANON IN PLACE: ICD-10-CM

## 2018-12-21 PROBLEM — Z3A.40 40 WEEKS GESTATION OF PREGNANCY: Status: RESOLVED | Noted: 2017-10-20 | Resolved: 2018-12-21

## 2018-12-21 PROBLEM — Z13.220 SCREENING CHOLESTEROL LEVEL: Status: RESOLVED | Noted: 2018-08-30 | Resolved: 2018-12-21

## 2018-12-21 PROBLEM — Z13.1 SCREENING FOR DIABETES MELLITUS: Status: RESOLVED | Noted: 2018-08-30 | Resolved: 2018-12-21

## 2018-12-21 PROCEDURE — S0612 ANNUAL GYNECOLOGICAL EXAMINA: HCPCS | Performed by: NURSE PRACTITIONER

## 2018-12-21 NOTE — PATIENT INSTRUCTIONS
Etonogestrel (Implante)   Cathryn un embarazo  Joby(s) : Nexplanon   Existen muchas otras marcas de giuliana medicamento  Giuliana medicamento no debe ser usado cuando:   Giuliana medicamento no es adecuado para todas las personas  No lo use si usted ha tenido domitila reacción alérgica al etonogestrel o si está embarazada  No lo use si usted tiene cáncer de seno, enfermedad cardíaca, enfermedad hepática, o antecedentes de coágulos sanguíneos (cristian trombosis venosa profunda, embolia pulmonar o derrame cerebral)  Forma de usar giuliana medicamento:   Implante  · Domitila enfermera o un paramédico entrenado le aplicará scott medicamento  · Giuliana medicamento es un implante  Se coloca quirúrgicamente bajo la piel de scott brazo superior, interior  · Presione suavemente con las puntas de rajni dedos sobre la piel donde le colocaron el medicamento  Usted debe poder sentir el implante  · Es posible que deba usar otro método anticonceptivo mariela 7 días después de que se inserte el implante  Scott médico le dirá si esto es necesario  · Scott médico puede extraer el implante en cualquier momento si desea dejar de usar [employer]  El implante debe ser removido después de 3 años, monse usted puede tener michael nuevo insertado si todavía quiere usar giuliana método anticonceptivo  · Corinne y siga las instrucciones para el paciente que vienen con el medicamento  Hable con scott médico o farmacéutico si tiene alguna pregunta  Medicamentos y [employer] que debe evitar:   Consulte con scott médico o farmacéutico antes de usar cualquier medicamento, incluyendo los que compra sin receta médica, las vitaminas y los productos herbales  · Algunos alimentos y medicamentos pueden afectar la efectividad de etonogestrel   Informe a scott médico si está usando cualquiera de los siguientes:  ¨ Bosentan, carbamazepina, ciclosporina, felbamato, griseofulvina, itraconazol, ketoconazol, lamotrigina, oxcarbazepina, fenobarbital, fenitoína, rifampina, hierba [address] [place] [address], topiramato  ¨ Medicamento para el VIH / SIDA  Precauciones mariela el uso de giuliana medicamento:   · Informe a scott médico de inmediato si usted jack estar [facility]  El implante tendrá que ser retirado  · Informe a scott médico si usted tiene cáncer, problemas de circulación sanguínea, presión arterial dhiraj o enfermedad del riñón, o si usted fuma  Informe a scott médico si usted está dando de lactar o si usted ha dado a galen recientemente  También informe a scott médico si usted tiene diabetes o prediabetes, colesterol alto, o un historial de depresión  · Giuliana medicamento puede provocarle los siguientes problemas:  ¨ Embarazo ectópico (en las trompas)  ¨ Quistes en los ovarios  ¨ Posible riesgo de cáncer de seno  ¨ Un riesgo más alto de ataque al corazón, derrame cerebral o coágulos de roddy  ¨ Cáncer de hígado o tumores  ¨ Presión arterial dhiraj  · Giuliana medicamento puede cambiar scott ciclo menstrual normal  Es posible que tenga sangrado irregular o rajni periodos pueden ser Lawence Milks ligeros, más cortos, más pesados o más largos  Puede que no tenga un periodo en algunos ciclos  Sin embargo, llame a scott médico si jack que está embarazada o si tiene dolor severo o cambios que le preocupan  · Giuliana medicamento no la protegerá contra el VIH / SIDA u otras enfermedades de transmisión sexual   · Podría ser necesario retirarle el implante si va a estar inactiva mariela un período de [place], por ejemplo después de [facility] mayor, debido al riesgo de coágulos de [place]  · Dígale a todo médico o dentista encargado de atenderle que usted está usando [facility]  Puede que giuliana medicamento afecte algunos resultados de [employer]  · Scott médico tendrá que revisar scott progreso y los efectos de giuliana medicamento mariela rajni citas regulares  Cumpla sin falta con todas rajni citas médicas  Asista a todas rajni citas  · Guarde todos los medicamentos fuera del alcance de los niños  Nunca comparta rajni medicamentos con [employer]    Efectos secundarios que pueden presentarse mariela el uso de rashaad medicamento:   Consulte inmediatamente con el médico si nota cualquiera de estos efectos secundarios:  · Reacción alérgica: Comezón o ronchas, hinchazón del kirill o las michelle, hinchazón u hormigueo en la boca o garganta, opresión en el pecho, dificultad para respirar  · Dolor en el pecho, dificultad para respirar, o toser roddy  · [employer] o heces pálidas, náuseas, vómitos, falta de apetito, dolor estomacal, coloración amarillenta en la piel u ojos  · Visión doble u otros problemas para regine  · Adormecimiento o debilidad en un lado del cuerpo, dolor de anthony repentino o severo, problemas con el habla, la visión o para caminar  · Dolor en la parte inferior de la pierna (pantorrilla)  · Dolor severo o continuo, hormigueo, sangrado, moretones, enrojecimiento, picazón o inflamación donde se coloca el implante  · Dolor inusual o severo en el abdomen  · Sangrado vaginal o sangrado profuso inusual e inesperado  Consulte con el médico si nota los siguientes efectos secundarios menos graves:   · Acné o espinillas  · Dolor de anthony moderado  · Dolor leve, hormigueo, sangrado, moretones, enrojecimiento, picazón o inflamación donde se colocó el implante  · Cambios en el estado de ánimo  · Aumento de peso  Consulte con el médico si nota otros efectos secundarios que jack son causados por rashaad medicamento  Llame a martin médico para consultarle George Sandoval puede notificar rajni efectos secundarios al FDA al [id number]  © 2017 [address] Information is for End User's use only and may not be sold, redistributed or otherwise used for commercial purposes  Esta información es sólo para uso en educación  Martin intención no es darle un consejo médico sobre enfermedades o tratamientos  Colsulte con martin Charna Heckler farmacéutico antes de seguir cualquier régimen médico para saber si es seguro y efectivo para usted    Autoexamen del seno para las mujeres   LO QUE NECESITA SABER:   ¿Qué es el autoexamen de seno? Un autoexamen de seno es Cristhian de revisar si rajni senos tienen protuberancias u otros cambios  Los autoexámenes de seno regulares pueden ayudarla a conocer cómo se melia y se sienten rajni senos normalmente  La mayoría de las protuberancias o cambios en el seno  no son cáncer, monse usted siempre debería ir con scott médico para que la revise  Scott médico también puede observarla e indicarle si usted está realizando scott autoexamen correctamente  ¿Por qué debería realizarme un autoexamen de seno? El cáncer de seno es el tipo de cáncer más común en las mujeres  Aún si a usted le [employer], todavía puede seguir revisándose regularmente  Si usted sabe cómo se sienten y se melia rajni senos normalmente, eso podría ayudarle a determinar cuándo comunicarse con scott médico  Es posible que nando mamografía no detecte algún cáncer  Usted podría encontrar nando protuberancia mariela un autoexamen de seno que no se detectó con scott mamografía  ¿Cuándo debería realizarme un autoexamen de seno? Darrick scott calendario para que recuerde hacerse un autoexamen del seno en nando fecha regular  Nando forma fácil de recordar es realizar el autoexamen de seno en el mismo día cada mes  Si usted tiene Emil, es posible que lo mejor sea que se radha un autoexamen 1 semana después de que terminó scott periodo  [place] es cuando rajni senos podrían estar menos inflamados, abultados o sensibles  Usted puede realizarse autoexámenes del seno regulares incluso si está dando de lactar o si tiene implantes en el seno  ¿Cómo debería realizarme un autoexamen de seno? · Observe rajni senos en un akira  Observe el tamaño y la forma de cada seno y del pezón  Revise si hay inflamación, protuberancias, hoyuelos, piel descamada u otros cambios en la piel  Vigile los cambios en el pezón, cristian cuando tiene dolor o que comienza a hundirse   Apriete cuidadosamente ambos pezones y revise si sale líquido (que no sea Nick) de ellos  Si usted detecta cualquiera de estos u otros cambios en rajni senos, comuníquese con scott médico  Revise rajni senos mientras está sentada o martin en las 3 siguientes posiciones:    ¨ Cuelgue rajni brazos en rajni costados  ¨ Levante rajni michelle y [place] detrás de scott anthony  ¨ Ejerza presión firme con rajni michelle sobre rajni caderas  Inclínese un poco hacia adelante mientras observa rajni senos en el akira  · Acuéstese y palpe rajni senos  Cuando usted se [place], el tejido de rajni senos se extiende uniformemente sobre scott pecho  [place] facilita que usted sienta protuberancias o cualquier cosa que podría no ser normal para rajni senos  Realice un autoexamen con un seno a la vez  ¨ Coloque nando almohada pequeña o nando toalla debajo de scott hombro jessenia  Coloque scott brazo jessenia detrás de scott anthony  ¨ Use los 3 dedos medios de scott mano derecha  Use las yemas de los dedos, en la parte superior  La yema del dedo es la parte más sensible de scott dedo  ¨ Pedro círculos pequeños para sentir el tejido del seno  Use las yemas de rajni dedos para hacer círculos pequeños empalmados sobre rajni senos y [place]  Use presión ligera, media y firme  T[place]ani, presione ligeramente  Después, presione con nando presión media para sentir un poco más profundo en scott seno  Por último, use presión firme para sentir scott seno en lo más profundo  ¨ Examine el área completa de scott seno  Examine el área del seno desde arriba hasta abajo donde usted siente las [place]  Lucy Dom pequeños con las yemas de los dedos, comenzando en la parte media de scott axila  Lucy Dom subiendo y [place] el área del seno  Continúe hacia scott seno, hasta llegar al [employer]  Examine toda el área desde la axila hasta el centro de scott pecho (Jesusita Conklin)  Deténgase en el centro de scott pecho      ¨ Mueva la almohada o toalla hacia scott hombro derecho y coloque scott Donnal Cirri derecho detrás de scott anthony  Use las yemas de los 3 dedos medios de christina mano izquierda y repita los pasos anteriores para realizar un autoexamen en christina seno derecho  ¿Qué más puedo hacer para la revisión de problemas de seno o del cáncer de seno? Algunos expertos sugieren que las mujeres de 36 años de edad y mayores deberían realizarse nando mamografía cada año  Otros sugieren [employer] 47 y 76 años de edad se bryan nando mamografía cada 2 años  Consulte con christina médico sobre cuándo usted debería [employer]  ¿Cuándo soy comunicarme con mi médico?   · Usted encuentra algún tipo de bulto o cambio en rajni senos  · Usted tiene [facility], o le sale líquido de rajni pezones  · Usted tiene preguntas o inquietudes acerca de christina condición o cuidado  ACUERDOS SOBRE CHRISTINA CUIDADO:   Usted tiene el derecho de ayudar a planear christina cuidado  Aprenda todo lo que pueda sobre christina condición y cristian darle tratamiento  Discuta rajni opciones de tratamiento con rajni médicos para decidir el cuidado que usted desea recibir  Usted siempre tiene el derecho de rechazar el tratamiento  Esta información es sólo para uso en educación  Christina intención no es darle un consejo médico sobre enfermedades o tratamientos  Colsulte con christina Ladena Creed farmacéutico antes de seguir cualquier régimen médico para saber si es seguro y efectivo para usted  © 2017 26[address] David Allen Information is for End User's use only and may not be sold, redistributed or otherwise used for commercial purposes  All illustrations and images included in CareNotes® are the copyrighted property of A D A M , Inc  or Rhett Baltazar  Human Papillomavirus Vaccine (Por inyección)   Ayuda a prevenir las verrugas genitales y cáncer en el ano, cérvix, vagina o vulva, los que pueden ser causados por el virus del papiloma humano (VPH)  Joby(s) : Cervarix, Gardasil, Gardasil 9   Existen muchas otras marcas de Naty    Giuliana medicamento no debe ser usado cuando:   Esta vacuna no es correcta para todas las [address]  Usted no debería recibirla si todd tenido nando reacción alérgica a la vacuna contra el virus del papiloma humano o a la levadura  Forma de usar rashaad medicamento:   Inyectable  · Scott médico le recetará scott dosis exacta y le indicará la frecuencia con la que debe administrarse  Rashaad medicamento se administra mediante nando inyección en michael de rajni músculos  · Estrella [place] u otro médico le administrará rashaad medicamento  · Esta vacuna debe administrarse en 2 o 3 dosis, según la kendrick  · Corinne y siga las instrucciones para el paciente que vienen con el medicamento  Hable con scott médico o farmacéutico si tiene alguna pregunta  · Si olvida nando dosis: Esta vacuna debe administrarse en un horario fijo  Si olvida la vacuna en el horario preestablecido llame a scott médico y solicite otra jassi lo antes posible  Medicamentos y [employer] que debe evitar:   Consulte con scott médico o farmacéutico antes de usar cualquier medicamento, incluyendo los que compra sin receta médica, las vitaminas y los productos herbales  · Algunos medicamento pueden afectar la eficacia de esta vacuna  Dígale a scott médico si usted está usando algún tratamiento que debilita el sistema inmunológico, cristian medicamentos para el cáncer, tratamiento de radiación o un esteroide  Precauciones mariela el uso de rashaad medicamento:   · Dígale a scott médico si usted está embarazada o dando de lactar, o si usted tiene un sistema inmunológico débil o es alérgico al látex  · Esta vacuna no protegerá contra enfermedades de transmisión sexual que no hayan sido causadas por el VPH  · Usted necesita seguir yendo donde scott médico para exámenes de rutina de detección del cáncer anal o cervical, incluso después de recibir [place]  · Usted podría sentir MICHAEL Eaton, desvanecimiento o mareos inmediatamente después de recibir esta vacuna   Es probable que scott médico le pida que espere 15 minutos antes de ponerse de pie   Efectos secundarios que pueden presentarse mariela el uso de rashaad medicamento:   Consulte inmediatamente con el médico si nota cualquiera de estos efectos secundarios:  · Reacción alérgica: Comezón o ronchas, hinchazón del kirill o las michelle, hinchazón u hormigueo en la boca o garganta, opresión en el pecho, dificultad para respirar  · Desvanecimientos, mareos o desmayos  Consulte con el médico si nota los siguientes efectos secundarios menos graves:   · Dolor de [place], cansancio  · Angelo moderada  · Dolor, enrojecimiento, comezón o inflamación donde le aplican la inyección  Consulte con el médico si nota otros efectos secundarios que jack son causados por rashaad medicamento  Llame a martin médico para consultarle George Sandoval puede notificar rajni efectos secundarios al FDA al [id number]  © 2017 2600 David Allen Information is for End User's use only and may not be sold, redistributed or otherwise used for commercial purposes  Esta información es sólo para uso en educación  Martin intención no es darle un consejo médico sobre enfermedades o tratamientos  Colsulte con martin Zainab Darins farmacéutico antes de seguir cualquier régimen médico para saber si es seguro y efectivo para usted

## 2018-12-21 NOTE — PROGRESS NOTES
Patient is Hebrew-speaking Anita Strange used to translate at today's visit  Subjective      Brittany Stearns is a 33 y o  female who presents for annual well woman exam   Last Pap smear 3/27/17 resulted NILM  Next Pap smear due 3/2020  Periods are irregular on Nexplanon for contraception  Patient is considering removal to conceive  No intermenstrual bleeding, spotting, or discharge  Current contraception: Nexplanon  History of abnormal Pap smear: no  Family history of uterine or ovarian cancer: no  Regular self breast exam: no  History of abnormal mammogram:  Mammograms to begin at age 35  Family history of breast cancer: no  History of abnormal lipids: no  Gardasil vaccine:  No    Menstrual History:  OB History      Para Term  AB Living    2 2 2 0 0 2    SAB TAB Ectopic Multiple Live Births    0 0 0 0 2         Menarche age: 14  Patient's last menstrual period was 11/10/2018  Period Cycle (Days):  (irregular with nexplanon)  Period Duration (Days): 4 days    The following portions of the patient's history were reviewed and updated as appropriate: allergies, current medications, past family history, past medical history, past social history, past surgical history and problem list     Review of Systems  Pertinent items are noted in HPI        Objective      /82 (BP Location: Left arm)   Pulse (!) 108   Ht 5' 1" (1 549 m)   Wt 64 9 kg (143 lb)   LMP 11/10/2018   BMI 27 02 kg/m²     General:   alert and oriented, in no acute distress, alert, cooperative, appears stated age and cooperative   Heart: regular rate and rhythm, S1, S2 normal, no murmur, click, rub or gallop   Lungs: clear to auscultation bilaterally   Abdomen: soft, non-tender, without masses or organomegaly, nondistended and normal bowel sounds   Vulva: normal, Bartholin's, Urethra, [place]'s normal, female escutcheon   Vagina: normal mucosa, normal discharge, no palpable nodules   Cervix: no cervical motion tenderness and no lesions   Uterus: normal size, non-tender, normal shape and consistency   Adnexa: normal adnexa and no mass, fullness, tenderness   Breast:  Nontender,  no palpable masses, no nipple discharge, no skin changes bilaterally  Nexplanon easily palpated left arm  Assessment      @well woman  no contraindication to continue hormonal therapy@   Plan      All questions answered  Breast self exam technique reviewed and patient encouraged to perform self-exam monthly  Contraception: Nexplanon  Diagnosis explained in detail, including differential   Dietary diary  Discussed healthy lifestyle modifications  Educational material distributed  Follow up in 1 Year for annual exam   Follow up as needed  Had flu vaccine this season  Breast awareness reviewed  Desires pregnancy-encouraged to avoid tobacco, alcohol and drug use  Encouraged to take prenatal vitamin daily  Reviewed recommendations for Gardasil vaccine up to age 39  Written information provided    Patient will call office if desires to begin series  RTO for Nexplanon removal

## 2018-12-21 NOTE — PROGRESS NOTES
SW MET WITH PT TO ASSIST WITH INTERPRETATION DURING GYN VISIT  PT HAS NO GYN CONCERN  WANTED TO REMOVED Marleny Daniel  WILL SCHEDULE APPT FOR REMOVAL  PT WITH CONCERN OVER THROAT PAIN  WAS ADVICE TO FOLLOW UP WITH PCP  NO OTHER CONCERN AT THIS TIME

## 2019-01-03 ENCOUNTER — OFFICE VISIT (OUTPATIENT)
Dept: FAMILY MEDICINE CLINIC | Facility: CLINIC | Age: 29
End: 2019-01-03
Payer: COMMERCIAL

## 2019-01-03 VITALS
WEIGHT: 143.2 LBS | HEIGHT: 61 IN | TEMPERATURE: 98.5 F | BODY MASS INDEX: 27.03 KG/M2 | SYSTOLIC BLOOD PRESSURE: 100 MMHG | DIASTOLIC BLOOD PRESSURE: 80 MMHG | HEART RATE: 89 BPM | OXYGEN SATURATION: 98 % | RESPIRATION RATE: 16 BRPM

## 2019-01-03 DIAGNOSIS — J02.9 SORE THROAT: ICD-10-CM

## 2019-01-03 DIAGNOSIS — R13.10 DYSPHAGIA, UNSPECIFIED TYPE: ICD-10-CM

## 2019-01-03 DIAGNOSIS — R10.13 EPIGASTRIC ABDOMINAL PAIN: ICD-10-CM

## 2019-01-03 DIAGNOSIS — K21.9 GASTROESOPHAGEAL REFLUX DISEASE, ESOPHAGITIS PRESENCE NOT SPECIFIED: Primary | ICD-10-CM

## 2019-01-03 LAB — S PYO AG THROAT QL: NEGATIVE

## 2019-01-03 PROCEDURE — 99214 OFFICE O/P EST MOD 30 MIN: CPT | Performed by: NURSE PRACTITIONER

## 2019-01-03 PROCEDURE — 87880 STREP A ASSAY W/OPTIC: CPT | Performed by: NURSE PRACTITIONER

## 2019-01-03 PROCEDURE — 3008F BODY MASS INDEX DOCD: CPT | Performed by: NURSE PRACTITIONER

## 2019-01-03 RX ORDER — OMEPRAZOLE 20 MG/1
20 TABLET, DELAYED RELEASE ORAL DAILY
Qty: 90 TABLET | Refills: 0 | Status: SHIPPED | OUTPATIENT
Start: 2019-01-03

## 2019-01-03 RX ORDER — RANITIDINE 150 MG/1
150 TABLET ORAL DAILY
Qty: 90 TABLET | Refills: 0 | Status: SHIPPED | OUTPATIENT
Start: 2019-01-03

## 2019-01-03 NOTE — PATIENT INSTRUCTIONS
Enfermedad por reflujo gastroesofágico   LO QUE NECESITA SABER:   La enfermedad por reflujo gastroesofágico (Bhavya Vianey) ocurre cuando el ácido y los alimentos en el estómago regresan al esófago  La enfermedad por reflujo gastroesofágico es el reflujo que se produce más de [address] a la semana mariela varias semanas  Generalmente causa acidez y otros síntomas  La ERGE puede causar otros problemas de steven con el tiempo si no es tratada  INSTRUCCIONES SOBRE EL ANNA HOSPITALARIA:   Regrese a la gordon de emergencias si:   · Usted siente Willia Duverney y no puede eructar o vomitar  · Usted siente dolor rabia en el pecho y dificultad repentina para respirar  · Rajni evacuaciones intestinales son de color leonel, con [place], o de apariencia alquitranada  · Scott vómito parece cristian café molido o contiene roddy  Pregúntele a scott [employer] vitaminas y minerales son adecuados para usted  · Vomita grandes cantidades, o vomita con frecuencia  · Tiene dificultad para respirar después de vomitar  · Tiene dificultad para tragar o dolor al tragar  · Usted pierde peso sin proponérselo  · Rajni síntomas empeoran o no mejoran con el tratamiento  · Usted tiene preguntas o inquietudes acerca de scott condición o cuidado  Medicamentos:   · Medicamentos,  se utilizan para disminuir el ácido [place] medicamentos también podrían usarse para ayudar a que scott esfínter esofágico inferior y scott estómago se contraigan (estrechen) más  · Orrum rajni medicamentos cristian se le haya indicado  Consulte con scott médico si usted jack que scott medicamento no le está ayudando o si presenta efectos secundarios  Infórmele si es alérgico a algún medicamento  Mantenga nando lista actualizada de los [place], las vitaminas y los productos herbales que dorothy  Incluya los siguientes datos de los medicamentos: cantidad, frecuencia y motivo de administración  Traiga con usted la lista o los envases de la píldoras a rajni citas de seguimiento   Lleve la lista de los medicamentos con usted en earl de nando emergencia  Control de la ERGE:   · No consuma alimentos o bebidas que puedan aumentar la [place]  Estos incluyen chocolate, menta, comidas fritas o grasosas, bebidas que contienen cafeína o bebidas gaseosas  Otros alimentos incluyen comidas picantes, cebollas, tomates y alimentos a base de tomate  No consuma alimentos y bebidas que puedan irritar scott esófago, cristian las frutas cítricas, los jugos y las bebidas alcohólicas  · No ingiera comidas abundantes  Cuando usted come [employer] a la vez, scott estómago necesita más ácido para digerirla  Consuma 6 comidas pequeñas al día en vez de 3 comidas grandes y coma lentamente  No consuma alimentos entre 2 y 3 horas antes de WEDGECARRUP  · Eleve la cabecera de scott cama  Coloque bloques de 6 pulgadas debajo de la cabecera de la estructura de scott cama  También podría usar más nando almohada para apoyar scott anthony y hombros mientras duerme  · Mantenga un peso saludable  Si usted tiene sobrepeso, la pérdida de peso podría ayudar a aliviar los síntomas de la [place]  · No fume  Fumar debilita el esfínter esofágico inferior y [place] el riesgo de [place]rfwjjfet[place]ut[place]  Pida información a scott médico si usted actualmente fuma y necesita ayuda para dejar de fumar  Los cigarrillos electrónicos o tabaco sin humo todavía contienen nicotina  Consulte con scott médico antes de [employer]  · No use ropa que [place] alrededor de la cintura  La ropa apretada puede ejercer presión [employer] y causar o empeorar los síntomas de la [place]  Acuda a rajni consultas de control con scott médico según le indicaron  Anote rajni preguntas para que se acuerde de hacerlas mariela rajni visitas  © 2017 [facility] Information is for End User's use only and may not be sold, redistributed or otherwise used for commercial purposes   All illustrations and images included in CareNotes® are the copyrighted property of [employer]  or Rhett Delaney  Esta información es sólo para uso en educación  Scott intención no es darle un consejo médico sobre enfermedades o tratamientos  Colsulte con scott Luna Antis farmacéutico antes de seguir cualquier régimen médico para saber si es seguro y efectivo para usted

## 2019-01-03 NOTE — PROGRESS NOTES
Assessment/Plan:  Symptoms related to GERD and reflux - start PPI daily and H2 blocker before night  Low acid diet discussed  Needs EGD - send to GI  No problem-specific Assessment & Plan notes found for this encounter  Diagnoses and all orders for this visit:    Gastroesophageal reflux disease, esophagitis presence not specified  -     omeprazole (PriLOSEC OTC) 20 MG tablet; Take 1 tablet (20 mg total) by mouth daily  -     ranitidine (ZANTAC) 150 mg tablet; Take 1 tablet (150 mg total) by mouth daily Before bed  -     Ambulatory referral to Gastroenterology; Future    Sore throat  -     POCT rapid strepA    Dysphagia, unspecified type  -     Ambulatory referral to Gastroenterology; Future    Epigastric abdominal pain  -     Ambulatory referral to Gastroenterology; Future          Subjective: Patient complain of a sore throat   Patient says she has had this pain for years      Patient ID: Immanuel Monae is a 33 y o  female  HPI  Pt has had sore throat that comes and goes for 6 years  Associated w/ epigastric burning and difficulty swallowing  She is from [facility] - she had an esophageal infection  treated in the past in her country  US of thyroid was done last year - shows sub centimeter - few subcentimeter colloid cysts in the left lobe of the thyroid gland  TSH nl  No vomiting  No blood in stool    The following portions of the patient's history were reviewed and updated as appropriate: allergies, past social history, past surgical history and problem list     Review of Systems   Constitutional: Negative  HENT: Positive for sore throat, trouble swallowing and voice change  Respiratory: Negative  Cardiovascular: Negative  Gastrointestinal: Positive for abdominal pain  Negative for blood in stool, constipation, diarrhea, nausea, rectal pain and vomiting           Objective:      /80 (BP Location: Left arm, Patient Position: Sitting, Cuff Size: Adult)   Pulse 89   Temp 98 5 °F (36 9 °C) (Oral)   Resp 16   Ht 5' 1" (1 549 m)   Wt 65 kg (143 lb 3 2 oz)   SpO2 98%   BMI 27 06 kg/m²          Physical Exam   Constitutional: She is oriented to person, place, and time  She appears well-developed and well-nourished  No distress  HENT:   Head: Normocephalic  Right Ear: Tympanic membrane and external ear normal  No decreased hearing is noted  Left Ear: Tympanic membrane and external ear normal  No decreased hearing is noted  Nose: Nose normal    Mouth/Throat: Uvula is midline, oropharynx is clear and moist and mucous membranes are normal  No oropharyngeal exudate, posterior oropharyngeal edema, posterior oropharyngeal erythema or tonsillar abscesses  Eyes: Conjunctivae are normal    Neck: Normal range of motion  Neck supple  No thyromegaly present  Cardiovascular: Normal rate, regular rhythm and normal heart sounds  No murmur heard  Pulmonary/Chest: Effort normal and breath sounds normal  No respiratory distress  Abdominal: Soft  Bowel sounds are normal  She exhibits no distension and no mass  There is no tenderness  There is no rebound and no guarding  Lymphadenopathy:     She has no cervical adenopathy  Neurological: She is alert and oriented to person, place, and time  Skin: Skin is warm and dry  Psychiatric: She has a normal mood and affect   Her behavior is normal

## 2019-01-17 ENCOUNTER — PROCEDURE VISIT (OUTPATIENT)
Dept: OBGYN CLINIC | Facility: CLINIC | Age: 29
End: 2019-01-17

## 2019-01-17 VITALS
DIASTOLIC BLOOD PRESSURE: 75 MMHG | HEIGHT: 64 IN | HEART RATE: 91 BPM | SYSTOLIC BLOOD PRESSURE: 112 MMHG | WEIGHT: 148 LBS | BODY MASS INDEX: 25.27 KG/M2

## 2019-01-17 DIAGNOSIS — Z30.46 NEXPLANON REMOVAL: Primary | ICD-10-CM

## 2019-01-17 PROCEDURE — 11982 REMOVE DRUG IMPLANT DEVICE: CPT | Performed by: OBSTETRICS & GYNECOLOGY

## 2019-01-17 NOTE — PROGRESS NOTES
Remove and insert drug implant  Date/Time: 1/17/2019 4:47 PM  Performed by: Toña Dunn  Authorized by: Toña Dunn     Consent:     Consent obtained:  Written    Consent given by:  Patient    Procedural risks discussed:  Bleeding, infection, repeat procedure, possible continued pain and possible loss of function    Patient questions answered: yes      Patient agrees, verbalizes understanding, and wants to proceed: yes    Indication:     Indication: Presence of non-biodegradable drug delivery implant    Pre-procedure:     Pre-procedure timeout performed: yes      Prepped with: alcohol 70% and povidone-iodine      Local anesthetic:  Lidocaine 1%  Procedure:     Procedure:  Removal    Left/right:  Left    Site was closed with steri-strips and pressure bandage applied: yes    Comments:      Successful removal w/o complications, for desire to conceive

## 2021-03-02 ENCOUNTER — ULTRASOUND (OUTPATIENT)
Dept: OBGYN CLINIC | Facility: CLINIC | Age: 31
End: 2021-03-02

## 2021-03-02 VITALS
WEIGHT: 125 LBS | HEART RATE: 82 BPM | DIASTOLIC BLOOD PRESSURE: 60 MMHG | SYSTOLIC BLOOD PRESSURE: 98 MMHG | BODY MASS INDEX: 21.46 KG/M2

## 2021-03-02 DIAGNOSIS — Z34.90 EARLY STAGE OF PREGNANCY: Primary | ICD-10-CM

## 2021-03-02 DIAGNOSIS — R11.0 NAUSEA: ICD-10-CM

## 2021-03-02 LAB — SL AMB POCT URINE HCG: POSITIVE

## 2021-03-02 PROCEDURE — PNV: Performed by: OBSTETRICS & GYNECOLOGY

## 2021-03-02 PROCEDURE — 81025 URINE PREGNANCY TEST: CPT | Performed by: OBSTETRICS & GYNECOLOGY

## 2021-03-02 RX ORDER — ONDANSETRON 4 MG/1
4 TABLET, ORALLY DISINTEGRATING ORAL EVERY 6 HOURS PRN
Qty: 20 TABLET | Refills: 3 | Status: SHIPPED | OUTPATIENT
Start: 2021-03-02 | End: 2021-04-01

## 2021-03-02 NOTE — PROGRESS NOTES
Note on use of High Level Disinfection Process (Trophon) for transvaginal probe:     Alice Beaulieu    REF: [id number]   SN: [id number]     [address] [place] [address] Likely #[id number]

## 2021-03-02 NOTE — PROGRESS NOTES
Subjective:     Edgardo Vicente is a 31 y o   female who presents with positive pregnancy test and LMP of 21  She reports some nausea  Denies cramping or bleeding  She has 2 prior uncomplicated vaginal deliveries  Objective:    Vitals: Blood pressure 98/60, pulse 82, weight 56 7 kg (125 lb), last menstrual period 2021, currently breastfeeding  Body mass index is 21 46 kg/m²  FIRST TRIMESTER OBSTETRIC ULTRASOUND  3/2/2021  Amy Blanchard MD     INDICATION: Amenorrhea, viability    COMPARISON: None  TECHNIQUE:   Transvaginal imaging was performed to assess the gestation, myometrial/endometrial architecture and ovarian parenchymal detail  The study includes volumetric sweeps and traditional still imaging technique  FINDINGS:     A single intrauterine gestation is identified  Cardiac activity is detected at 143bpm       YOLK SAC:  Present and normal in size and appearance  MEAN CROWN RUMP LENGTH:  15 6 mm = 8 weeks 0 days   AMNIOTIC FLUID/SAC SHAPE:  Within expected normal range  UTERUS/ADNEXA:   No adnexal mass or pathologic cyst   No free fluid identified  IMPRESSION:    According to , will date based off LMP (21)  Final BALDEMAR: 10/12/21  Gestational age: 11w0d  Fetal cardiac activity detected  No adnexal masses seen       Assessment/Plan:    8 weeks gestation   -F/u for nursing intake and Prenatal H&P   -Continue PNV   -Zofran prescribed for nausea        Amy Blanchard MD  3/2/2021  5:05 PM